# Patient Record
Sex: FEMALE | Race: WHITE | Employment: FULL TIME | URBAN - METROPOLITAN AREA
[De-identification: names, ages, dates, MRNs, and addresses within clinical notes are randomized per-mention and may not be internally consistent; named-entity substitution may affect disease eponyms.]

---

## 2018-06-28 NOTE — PROGRESS NOTES
friction rub. No murmur heard. Pulmonary/Chest: Effort normal and breath sounds normal. No stridor. No respiratory distress. She has no wheezes. She has no rales. She exhibits no mass, no tenderness, no bony tenderness, no laceration, no edema, no deformity, no swelling and no retraction. Right breast exhibits no inverted nipple, no mass, no nipple discharge, no skin change and no tenderness. Left breast exhibits no inverted nipple, no mass, no nipple discharge, no skin change and no tenderness. Breasts are symmetrical.       Breasts are supple bilaterally. No skin dimpling or puckering. No nipple discharge. No lumps nodules or masses appreciated. No axillary lymphadenopathy. Abdominal: Soft. She exhibits no distension. There is no tenderness. There is no rebound and no guarding. Musculoskeletal: Normal range of motion. She exhibits no edema, tenderness or deformity. Right shoulder: Normal.        Left shoulder: Normal.   Lymphadenopathy:     She has no cervical adenopathy. Right cervical: No superficial cervical, no deep cervical and no posterior cervical adenopathy present. Left cervical: No superficial cervical, no deep cervical and no posterior cervical adenopathy present. Right axillary: No pectoral and no lateral adenopathy present. Left axillary: No pectoral and no lateral adenopathy present. Neurological: She is alert and oriented to person, place, and time. Coordination normal.   Skin: Skin is warm and dry. No rash noted. She is not diaphoretic. No erythema. No pallor. Psychiatric: She has a normal mood and affect. Her behavior is normal. Judgment and thought content normal.        Assessment:      46 y.o. female who's risk for breast cancer include age, gender, and nulliparity. She has a family history of colon cancer in her mother in her 63's; Father currently age 80 and undergoing work-up for colon cancer.       Bilateral screening mammogram on

## 2018-07-17 ASSESSMENT — ENCOUNTER SYMPTOMS
SINUS PAIN: 0
SHORTNESS OF BREATH: 0
CHOKING: 0
SINUS PRESSURE: 0
CONSTIPATION: 0
CHEST TIGHTNESS: 0
TROUBLE SWALLOWING: 0
COUGH: 0
BLOOD IN STOOL: 0
DIARRHEA: 0
WHEEZING: 0
NAUSEA: 0
SORE THROAT: 0
EYE DISCHARGE: 0
BACK PAIN: 0
RHINORRHEA: 0
ABDOMINAL PAIN: 0
VOMITING: 0
EYE ITCHING: 0
VOICE CHANGE: 0
ABDOMINAL DISTENTION: 0

## 2018-07-31 ENCOUNTER — HOSPITAL ENCOUNTER (OUTPATIENT)
Dept: GENERAL RADIOLOGY | Age: 53
Discharge: HOME | End: 2018-08-02
Payer: COMMERCIAL

## 2018-07-31 ENCOUNTER — OFFICE VISIT (OUTPATIENT)
Dept: BREAST CENTER | Age: 53
End: 2018-07-31
Payer: COMMERCIAL

## 2018-07-31 VITALS
OXYGEN SATURATION: 98 % | WEIGHT: 184.4 LBS | HEIGHT: 68 IN | SYSTOLIC BLOOD PRESSURE: 118 MMHG | DIASTOLIC BLOOD PRESSURE: 60 MMHG | TEMPERATURE: 98.2 F | HEART RATE: 68 BPM | BODY MASS INDEX: 27.95 KG/M2 | RESPIRATION RATE: 18 BRPM

## 2018-07-31 DIAGNOSIS — N63.20 LEFT BREAST MASS: ICD-10-CM

## 2018-07-31 DIAGNOSIS — N63.0 BREAST LUMP: ICD-10-CM

## 2018-07-31 DIAGNOSIS — N63.0 BREAST LUMP IN UPPER OUTER QUADRANT: ICD-10-CM

## 2018-07-31 DIAGNOSIS — N63.0 BREAST LUMP: Primary | ICD-10-CM

## 2018-07-31 PROCEDURE — 99204 OFFICE O/P NEW MOD 45 MIN: CPT | Performed by: NURSE PRACTITIONER

## 2018-07-31 PROCEDURE — 99203 OFFICE O/P NEW LOW 30 MIN: CPT | Performed by: NURSE PRACTITIONER

## 2018-07-31 PROCEDURE — 76642 ULTRASOUND BREAST LIMITED: CPT

## 2018-07-31 PROCEDURE — G0279 TOMOSYNTHESIS, MAMMO: HCPCS

## 2018-07-31 RX ORDER — CHLORAL HYDRATE 500 MG
1000 CAPSULE ORAL DAILY
COMMUNITY
End: 2019-01-08

## 2018-07-31 RX ORDER — M-VIT,TX,IRON,MINS/CALC/FOLIC 27MG-0.4MG
1 TABLET ORAL DAILY
COMMUNITY
End: 2019-01-08

## 2018-07-31 NOTE — PATIENT INSTRUCTIONS
Patient will be seen in our office 1/8/19 @ 10:00am / imaging prior to appointment 1/8/19 @ 9:00am Breast [facility]

## 2018-10-06 ENCOUNTER — APPOINTMENT (OUTPATIENT)
Dept: GENERAL RADIOLOGY | Age: 53
DRG: 581 | End: 2018-10-06
Payer: COMMERCIAL

## 2018-10-06 ENCOUNTER — ANESTHESIA (OUTPATIENT)
Dept: OPERATING ROOM | Age: 53
DRG: 581 | End: 2018-10-06
Payer: COMMERCIAL

## 2018-10-06 ENCOUNTER — HOSPITAL ENCOUNTER (INPATIENT)
Age: 53
LOS: 1 days | Discharge: HOME | DRG: 581 | End: 2018-10-07
Attending: FAMILY MEDICINE | Admitting: ORTHOPAEDIC SURGERY
Payer: COMMERCIAL

## 2018-10-06 ENCOUNTER — ANESTHESIA EVENT (OUTPATIENT)
Dept: OPERATING ROOM | Age: 53
DRG: 581 | End: 2018-10-06
Payer: COMMERCIAL

## 2018-10-06 VITALS
TEMPERATURE: 97.3 F | DIASTOLIC BLOOD PRESSURE: 70 MMHG | RESPIRATION RATE: 10 BRPM | OXYGEN SATURATION: 100 % | SYSTOLIC BLOOD PRESSURE: 105 MMHG

## 2018-10-06 DIAGNOSIS — S61.419A LACERATION OF HAND INVOLVING EXTENSOR TENDON, INITIAL ENCOUNTER: ICD-10-CM

## 2018-10-06 DIAGNOSIS — S61.451A DOG BITE OF RIGHT HAND, INITIAL ENCOUNTER: Primary | ICD-10-CM

## 2018-10-06 DIAGNOSIS — S66.829A LACERATION OF HAND INVOLVING EXTENSOR TENDON, INITIAL ENCOUNTER: ICD-10-CM
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
## 2018-10-06 LAB
ABO/RH: NORMAL
ALBUMIN SERPL-MCNC: 4 G/DL (ref 3.5–5.2)
ALP BLD-CCNC: 83 U/L (ref 35–104)
ALT SERPL-CCNC: 25 U/L (ref 0–32)
ANION GAP SERPL CALCULATED.3IONS-SCNC: 11 MMOL/L (ref 7–16)
ANTIBODY SCREEN: NORMAL
APTT: 28.1 SEC (ref 24.5–35.1)
AST SERPL-CCNC: 18 U/L (ref 0–31)
BILIRUB SERPL-MCNC: 0.8 MG/DL (ref 0–1.2)
BUN BLDV-MCNC: 13 MG/DL (ref 6–20)
CALCIUM SERPL-MCNC: 8.9 MG/DL (ref 8.6–10.2)
CHLORIDE BLD-SCNC: 104 MMOL/L (ref 98–107)
CO2: 25 MMOL/L (ref 22–29)
CREAT SERPL-MCNC: 0.9 MG/DL (ref 0.5–1)
EKG ATRIAL RATE: 64 BPM
EKG P AXIS: 72 DEGREES
EKG P-R INTERVAL: 178 MS
EKG Q-T INTERVAL: 410 MS
EKG QRS DURATION: 102 MS
EKG QTC CALCULATION (BAZETT): 422 MS
EKG R AXIS: 38 DEGREES
EKG T AXIS: 39 DEGREES
EKG VENTRICULAR RATE: 64 BPM
GFR AFRICAN AMERICAN: >60
GFR NON-AFRICAN AMERICAN: >60 ML/MIN/1.73
GLUCOSE BLD-MCNC: 136 MG/DL (ref 74–109)
HCT VFR BLD CALC: 35.1 % (ref 34–48)
HEMOGLOBIN: 11.5 G/DL (ref 11.5–15.5)
INR BLD: 1.1
MCH RBC QN AUTO: 29.1 PG (ref 26–35)
MCHC RBC AUTO-ENTMCNC: 32.8 % (ref 32–34.5)
MCV RBC AUTO: 88.9 FL (ref 80–99.9)
PDW BLD-RTO: 13.2 FL (ref 11.5–15)
PLATELET # BLD: 218 E9/L (ref 130–450)
PMV BLD AUTO: 9.4 FL (ref 7–12)
POTASSIUM SERPL-SCNC: 3.7 MMOL/L (ref 3.5–5)
PROTHROMBIN TIME: 13.2 SEC (ref 9.3–12.4)
RBC # BLD: 3.95 E12/L (ref 3.5–5.5)
SODIUM BLD-SCNC: 140 MMOL/L (ref 132–146)
TOTAL PROTEIN: 7.1 G/DL (ref 6.4–8.3)
WBC # BLD: 8.2 E9/L (ref 4.5–11.5)

## 2018-10-06 PROCEDURE — 85730 THROMBOPLASTIN TIME PARTIAL: CPT

## 2018-10-06 PROCEDURE — 94761 N-INVAS EAR/PLS OXIMETRY MLT: CPT

## 2018-10-06 PROCEDURE — 20103 EXPL PENTRG WOUND EXTREMITY: CPT | Performed by: ORTHOPAEDIC SURGERY

## 2018-10-06 PROCEDURE — 6360000002 HC RX W HCPCS: Performed by: NURSE ANESTHETIST, CERTIFIED REGISTERED

## 2018-10-06 PROCEDURE — 80053 COMPREHEN METABOLIC PANEL: CPT

## 2018-10-06 PROCEDURE — 11043 DBRDMT MUSC&/FSCA 1ST 20/<: CPT | Performed by: ORTHOPAEDIC SURGERY

## 2018-10-06 PROCEDURE — 2500000003 HC RX 250 WO HCPCS: Performed by: NURSE ANESTHETIST, CERTIFIED REGISTERED

## 2018-10-06 PROCEDURE — 86901 BLOOD TYPING SEROLOGIC RH(D): CPT

## 2018-10-06 PROCEDURE — 26410 REPAIR HAND TENDON: CPT | Performed by: ORTHOPAEDIC SURGERY

## 2018-10-06 PROCEDURE — 85027 COMPLETE CBC AUTOMATED: CPT

## 2018-10-06 PROCEDURE — 7100000000 HC PACU RECOVERY - FIRST 15 MIN: Performed by: ORTHOPAEDIC SURGERY

## 2018-10-06 PROCEDURE — 6370000000 HC RX 637 (ALT 250 FOR IP): Performed by: ORTHOPAEDIC SURGERY

## 2018-10-06 PROCEDURE — 96365 THER/PROPH/DIAG IV INF INIT: CPT

## 2018-10-06 PROCEDURE — 99223 1ST HOSP IP/OBS HIGH 75: CPT | Performed by: ORTHOPAEDIC SURGERY

## 2018-10-06 PROCEDURE — 93005 ELECTROCARDIOGRAM TRACING: CPT | Performed by: STUDENT IN AN ORGANIZED HEALTH CARE EDUCATION/TRAINING PROGRAM

## 2018-10-06 PROCEDURE — 0LQ80ZZ REPAIR LEFT HAND TENDON, OPEN APPROACH: ICD-10-PCS | Performed by: ORTHOPAEDIC SURGERY

## 2018-10-06 PROCEDURE — 2500000003 HC RX 250 WO HCPCS: Performed by: ORTHOPAEDIC SURGERY

## 2018-10-06 PROCEDURE — 73130 X-RAY EXAM OF HAND: CPT

## 2018-10-06 PROCEDURE — 2709999900 HC NON-CHARGEABLE SUPPLY: Performed by: ORTHOPAEDIC SURGERY

## 2018-10-06 PROCEDURE — 96376 TX/PRO/DX INJ SAME DRUG ADON: CPT

## 2018-10-06 PROCEDURE — 3600000002 HC SURGERY LEVEL 2 BASE: Performed by: ORTHOPAEDIC SURGERY

## 2018-10-06 PROCEDURE — 36415 COLL VENOUS BLD VENIPUNCTURE: CPT

## 2018-10-06 PROCEDURE — 7100000001 HC PACU RECOVERY - ADDTL 15 MIN: Performed by: ORTHOPAEDIC SURGERY

## 2018-10-06 PROCEDURE — 85610 PROTHROMBIN TIME: CPT

## 2018-10-06 PROCEDURE — 13132 CMPLX RPR F/C/C/M/N/AX/G/H/F: CPT | Performed by: ORTHOPAEDIC SURGERY

## 2018-10-06 PROCEDURE — 6360000002 HC RX W HCPCS: Performed by: NURSE PRACTITIONER

## 2018-10-06 PROCEDURE — 3600000012 HC SURGERY LEVEL 2 ADDTL 15MIN: Performed by: ORTHOPAEDIC SURGERY

## 2018-10-06 PROCEDURE — 3700000000 HC ANESTHESIA ATTENDED CARE: Performed by: ORTHOPAEDIC SURGERY

## 2018-10-06 PROCEDURE — 3700000001 HC ADD 15 MINUTES (ANESTHESIA): Performed by: ORTHOPAEDIC SURGERY

## 2018-10-06 PROCEDURE — 86900 BLOOD TYPING SEROLOGIC ABO: CPT

## 2018-10-06 PROCEDURE — 2580000003 HC RX 258: Performed by: NURSE ANESTHETIST, CERTIFIED REGISTERED

## 2018-10-06 PROCEDURE — 96375 TX/PRO/DX INJ NEW DRUG ADDON: CPT

## 2018-10-06 PROCEDURE — 0KBD0ZZ EXCISION OF LEFT HAND MUSCLE, OPEN APPROACH: ICD-10-PCS | Performed by: ORTHOPAEDIC SURGERY

## 2018-10-06 PROCEDURE — 90471 IMMUNIZATION ADMIN: CPT | Performed by: NURSE PRACTITIONER

## 2018-10-06 PROCEDURE — 71045 X-RAY EXAM CHEST 1 VIEW: CPT

## 2018-10-06 PROCEDURE — 13133 CMPLX RPR F/C/C/M/N/AX/G/H/F: CPT | Performed by: ORTHOPAEDIC SURGERY

## 2018-10-06 PROCEDURE — 99285 EMERGENCY DEPT VISIT HI MDM: CPT

## 2018-10-06 PROCEDURE — 86850 RBC ANTIBODY SCREEN: CPT

## 2018-10-06 PROCEDURE — 90715 TDAP VACCINE 7 YRS/> IM: CPT | Performed by: NURSE PRACTITIONER

## 2018-10-06 RX ORDER — MEPERIDINE HYDROCHLORIDE 50 MG/ML
12.5 INJECTION INTRAMUSCULAR; INTRAVENOUS; SUBCUTANEOUS EVERY 5 MIN PRN
Status: DISCONTINUED | OUTPATIENT
Start: 2018-10-06 | End: 2018-10-07 | Stop reason: HOSPADM

## 2018-10-06 RX ORDER — GLYCOPYRROLATE 1 MG/5 ML
SYRINGE (ML) INTRAVENOUS PRN
Status: DISCONTINUED | OUTPATIENT
Start: 2018-10-06 | End: 2018-10-07 | Stop reason: SDUPTHER

## 2018-10-06 RX ORDER — PROMETHAZINE HYDROCHLORIDE 25 MG/ML
6.25 INJECTION, SOLUTION INTRAMUSCULAR; INTRAVENOUS
Status: ACTIVE | OUTPATIENT
Start: 2018-10-06 | End: 2018-10-06

## 2018-10-06 RX ORDER — LEVOFLOXACIN 500 MG/1
500 TABLET, FILM COATED ORAL DAILY
COMMUNITY
End: 2018-10-22 | Stop reason: ALTCHOICE

## 2018-10-06 RX ORDER — NEOSTIGMINE METHYLSULFATE 1 MG/ML
INJECTION, SOLUTION INTRAVENOUS PRN
Status: DISCONTINUED | OUTPATIENT
Start: 2018-10-06 | End: 2018-10-07 | Stop reason: SDUPTHER

## 2018-10-06 RX ORDER — MORPHINE SULFATE 2 MG/ML
2 INJECTION, SOLUTION INTRAMUSCULAR; INTRAVENOUS EVERY 5 MIN PRN
Status: DISCONTINUED | OUTPATIENT
Start: 2018-10-06 | End: 2018-10-07 | Stop reason: HOSPADM

## 2018-10-06 RX ORDER — ROCURONIUM BROMIDE 10 MG/ML
INJECTION, SOLUTION INTRAVENOUS PRN
Status: DISCONTINUED | OUTPATIENT
Start: 2018-10-06 | End: 2018-10-07 | Stop reason: SDUPTHER

## 2018-10-06 RX ORDER — SODIUM CHLORIDE 9 MG/ML
INJECTION, SOLUTION INTRAVENOUS CONTINUOUS PRN
Status: DISCONTINUED | OUTPATIENT
Start: 2018-10-06 | End: 2018-10-07 | Stop reason: SDUPTHER

## 2018-10-06 RX ORDER — LIDOCAINE HYDROCHLORIDE 20 MG/ML
INJECTION, SOLUTION INFILTRATION; PERINEURAL PRN
Status: DISCONTINUED | OUTPATIENT
Start: 2018-10-06 | End: 2018-10-07 | Stop reason: SDUPTHER

## 2018-10-06 RX ORDER — FENTANYL CITRATE 50 UG/ML
INJECTION, SOLUTION INTRAMUSCULAR; INTRAVENOUS PRN
Status: DISCONTINUED | OUTPATIENT
Start: 2018-10-06 | End: 2018-10-07 | Stop reason: SDUPTHER

## 2018-10-06 RX ORDER — HYDRALAZINE HYDROCHLORIDE 20 MG/ML
5 INJECTION INTRAMUSCULAR; INTRAVENOUS EVERY 10 MIN PRN
Status: DISCONTINUED | OUTPATIENT
Start: 2018-10-06 | End: 2018-10-07 | Stop reason: HOSPADM

## 2018-10-06 RX ORDER — PROPOFOL 10 MG/ML
INJECTION, EMULSION INTRAVENOUS PRN
Status: DISCONTINUED | OUTPATIENT
Start: 2018-10-06 | End: 2018-10-07 | Stop reason: SDUPTHER

## 2018-10-06 RX ORDER — DIAPER,BRIEF,INFANT-TODD,DISP
EACH MISCELLANEOUS PRN
Status: DISCONTINUED | OUTPATIENT
Start: 2018-10-06 | End: 2018-10-07 | Stop reason: HOSPADM

## 2018-10-06 RX ORDER — ONDANSETRON 2 MG/ML
4 INJECTION INTRAMUSCULAR; INTRAVENOUS ONCE
Status: COMPLETED | OUTPATIENT
Start: 2018-10-06 | End: 2018-10-06

## 2018-10-06 RX ORDER — BUPIVACAINE HYDROCHLORIDE 5 MG/ML
INJECTION, SOLUTION EPIDURAL; INTRACAUDAL PRN
Status: DISCONTINUED | OUTPATIENT
Start: 2018-10-06 | End: 2018-10-07 | Stop reason: HOSPADM

## 2018-10-06 RX ORDER — DEXAMETHASONE SODIUM PHOSPHATE 10 MG/ML
INJECTION, SOLUTION INTRAMUSCULAR; INTRAVENOUS PRN
Status: DISCONTINUED | OUTPATIENT
Start: 2018-10-06 | End: 2018-10-07 | Stop reason: SDUPTHER

## 2018-10-06 RX ORDER — ONDANSETRON 2 MG/ML
INJECTION INTRAMUSCULAR; INTRAVENOUS PRN
Status: DISCONTINUED | OUTPATIENT
Start: 2018-10-06 | End: 2018-10-07 | Stop reason: SDUPTHER

## 2018-10-06 RX ORDER — LABETALOL HYDROCHLORIDE 5 MG/ML
5 INJECTION, SOLUTION INTRAVENOUS EVERY 10 MIN PRN
Status: DISCONTINUED | OUTPATIENT
Start: 2018-10-06 | End: 2018-10-07 | Stop reason: HOSPADM

## 2018-10-06 RX ORDER — MIDAZOLAM HYDROCHLORIDE 1 MG/ML
INJECTION INTRAMUSCULAR; INTRAVENOUS PRN
Status: DISCONTINUED | OUTPATIENT
Start: 2018-10-06 | End: 2018-10-07 | Stop reason: SDUPTHER

## 2018-10-06 RX ADMIN — ROCURONIUM BROMIDE 40 MG: 10 INJECTION INTRAVENOUS at 22:42

## 2018-10-06 RX ADMIN — CEFAZOLIN SODIUM 2 G: 2 SOLUTION INTRAVENOUS at 23:00

## 2018-10-06 RX ADMIN — ONDANSETRON HYDROCHLORIDE 4 MG: 2 INJECTION, SOLUTION INTRAMUSCULAR; INTRAVENOUS at 23:41

## 2018-10-06 RX ADMIN — Medication 0.6 MG: at 23:41

## 2018-10-06 RX ADMIN — MIDAZOLAM HYDROCHLORIDE 2 MG: 1 INJECTION, SOLUTION INTRAMUSCULAR; INTRAVENOUS at 22:42

## 2018-10-06 RX ADMIN — PROPOFOL 150 MG: 10 INJECTION, EMULSION INTRAVENOUS at 22:42

## 2018-10-06 RX ADMIN — SODIUM CHLORIDE: 9 INJECTION, SOLUTION INTRAVENOUS at 22:41

## 2018-10-06 RX ADMIN — Medication 3 MG: at 23:41

## 2018-10-06 RX ADMIN — LIDOCAINE HYDROCHLORIDE 60 MG: 20 INJECTION, SOLUTION INFILTRATION; PERINEURAL at 22:42

## 2018-10-06 RX ADMIN — TETANUS TOXOID, REDUCED DIPHTHERIA TOXOID AND ACELLULAR PERTUSSIS VACCINE, ADSORBED 0.5 ML: 5; 2.5; 8; 8; 2.5 SUSPENSION INTRAMUSCULAR at 16:43

## 2018-10-06 RX ADMIN — FENTANYL CITRATE 75 MCG: 50 INJECTION, SOLUTION INTRAMUSCULAR; INTRAVENOUS at 22:42

## 2018-10-06 RX ADMIN — HYDROMORPHONE HYDROCHLORIDE 0.5 MG: 1 INJECTION, SOLUTION INTRAMUSCULAR; INTRAVENOUS; SUBCUTANEOUS at 18:49

## 2018-10-06 RX ADMIN — DEXAMETHASONE SODIUM PHOSPHATE 10 MG: 10 INJECTION INTRAMUSCULAR; INTRAVENOUS at 22:42

## 2018-10-06 RX ADMIN — CEFAZOLIN SODIUM 2 G: 2 SOLUTION INTRAVENOUS at 16:51

## 2018-10-06 RX ADMIN — HYDROMORPHONE HYDROCHLORIDE 0.5 MG: 1 INJECTION, SOLUTION INTRAMUSCULAR; INTRAVENOUS; SUBCUTANEOUS at 16:55

## 2018-10-06 RX ADMIN — ONDANSETRON HYDROCHLORIDE 4 MG: 2 SOLUTION INTRAMUSCULAR; INTRAVENOUS at 16:55

## 2018-10-06 ASSESSMENT — PULMONARY FUNCTION TESTS
PIF_VALUE: 15
PIF_VALUE: 17
PIF_VALUE: 19
PIF_VALUE: 5
PIF_VALUE: 2
PIF_VALUE: 18
PIF_VALUE: 29
PIF_VALUE: 18
PIF_VALUE: 19
PIF_VALUE: 18
PIF_VALUE: 17
PIF_VALUE: 15
PIF_VALUE: 18
PIF_VALUE: 19
PIF_VALUE: 19
PIF_VALUE: 15
PIF_VALUE: 18
PIF_VALUE: 2
PIF_VALUE: 18
PIF_VALUE: 17
PIF_VALUE: 19
PIF_VALUE: 18
PIF_VALUE: 29
PIF_VALUE: 15
PIF_VALUE: 2
PIF_VALUE: 18
PIF_VALUE: 15
PIF_VALUE: 15
PIF_VALUE: 19
PIF_VALUE: 18
PIF_VALUE: 17
PIF_VALUE: 18
PIF_VALUE: 18
PIF_VALUE: 17
PIF_VALUE: 25
PIF_VALUE: 17
PIF_VALUE: 3
PIF_VALUE: 19
PIF_VALUE: 19
PIF_VALUE: 18
PIF_VALUE: 15
PIF_VALUE: 19
PIF_VALUE: 19
PIF_VALUE: 18
PIF_VALUE: 19
PIF_VALUE: 15
PIF_VALUE: 2
PIF_VALUE: 14
PIF_VALUE: 17
PIF_VALUE: 21
PIF_VALUE: 18
PIF_VALUE: 18
PIF_VALUE: 2
PIF_VALUE: 17
PIF_VALUE: 19
PIF_VALUE: 19
PIF_VALUE: 15
PIF_VALUE: 19
PIF_VALUE: 15
PIF_VALUE: 3
PIF_VALUE: 19
PIF_VALUE: 19
PIF_VALUE: 17
PIF_VALUE: 19
PIF_VALUE: 18
PIF_VALUE: 10
PIF_VALUE: 18
PIF_VALUE: 10
PIF_VALUE: 17
PIF_VALUE: 18
PIF_VALUE: 18
PIF_VALUE: 0
PIF_VALUE: 2

## 2018-10-06 ASSESSMENT — PAIN DESCRIPTION - PAIN TYPE: TYPE: ACUTE PAIN

## 2018-10-06 ASSESSMENT — PAIN DESCRIPTION - LOCATION: LOCATION: HAND

## 2018-10-06 ASSESSMENT — PAIN SCALES - GENERAL
PAINLEVEL_OUTOF10: 1
PAINLEVEL_OUTOF10: 9
PAINLEVEL_OUTOF10: 1
PAINLEVEL_OUTOF10: 3

## 2018-10-06 ASSESSMENT — PAIN DESCRIPTION - ORIENTATION: ORIENTATION: LEFT

## 2018-10-06 NOTE — ED NOTES
Patient presents as a transfer from [place], patient seen by ortho, wound cleaned and dressing changed, plans for surgery this evening.       Leonel Bolton RN  10/06/18 [id number]

## 2018-10-06 NOTE — ED PROVIDER NOTES
Normal.    Constitutional:  Alert, development consistent with age. Neck:  Normal ROM. Supple. Extremity(s):  Left:              Tenderness:  mild. Swelling: None. Deformity: No.               ROM: full range of motion. Skin:  6 cm laceration to the dorsum of hand; 4 cm to the palm thenar region and 3 cm to the volar aspect of the proximal fifth finger. Neurovascular: Motor deficit: none; flexion extension against opposition noted in all MCP P I P and DIP joints. Tendon exposed and hanging. Sensory deficit: none; full sensation intact to light touch in fingers. Pulse deficit: none; 2 + radial pulses intact. Capillary refill: normal; less than 3 seconds in distal finger tips. Lymphatics: No lymphangitis or adenopathy noted. Neurological:  Oriented. Motor functions intact. Physical Exam   Musculoskeletal:        Hands:    Lab / Imaging Results   (All laboratory and radiology results have been personally reviewed by myself)  Labs:  No results found for this visit on 10/06/18. Imaging: All Radiology results interpreted by Radiologist unless otherwise noted. No orders to display       ED Course / Medical Decision Making     Medications   Tetanus-Diphth-Acell Pertussis (BOOSTRIX) injection 0.5 mL (not administered)     ED Course as of Oct 06 1806   Sat Oct 06, 2018   1756 Orthopedics resident notified in person. He will evaluate the patient in the emergency department. [JG]      ED Course User Index  [JG] ARELY Nichols NP     Consult(s):   IP CONSULT TO ORTHOPEDIC SURGERY [facility] Discussed patient with Dr. Claudia Downey and he wants the patient to go to [facility]. 5337 Discussed patient with Dr. Daniela Barton and he accept patient to the ED. Procedure(s):   none    MDM:   Dog bite with tendon rupture and will transfer to [facility] for orthopedic evaluation.  She was given tetanus and ancef 2 GM IV and will transfer

## 2018-10-07 VITALS
BODY MASS INDEX: 27.4 KG/M2 | WEIGHT: 185 LBS | SYSTOLIC BLOOD PRESSURE: 110 MMHG | RESPIRATION RATE: 17 BRPM | HEIGHT: 69 IN | DIASTOLIC BLOOD PRESSURE: 66 MMHG | TEMPERATURE: 98.6 F | OXYGEN SATURATION: 99 % | HEART RATE: 70 BPM
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
## 2018-10-07 PROBLEM — S61.452A: Status: ACTIVE | Noted: 2018-10-07

## 2018-10-07 PROBLEM — S61.259A: Status: ACTIVE | Noted: 2018-10-07

## 2018-10-07 PROBLEM — S61.451A DOG BITE OF RIGHT HAND: Status: ACTIVE | Noted: 2018-10-07

## 2018-10-07 PROCEDURE — 6360000002 HC RX W HCPCS: Performed by: STUDENT IN AN ORGANIZED HEALTH CARE EDUCATION/TRAINING PROGRAM

## 2018-10-07 PROCEDURE — 2580000003 HC RX 258: Performed by: STUDENT IN AN ORGANIZED HEALTH CARE EDUCATION/TRAINING PROGRAM

## 2018-10-07 PROCEDURE — 6370000000 HC RX 637 (ALT 250 FOR IP): Performed by: STUDENT IN AN ORGANIZED HEALTH CARE EDUCATION/TRAINING PROGRAM

## 2018-10-07 PROCEDURE — 1200000000 HC SEMI PRIVATE

## 2018-10-07 RX ORDER — HYDROCODONE BITARTRATE AND ACETAMINOPHEN 5; 325 MG/1; MG/1
1 TABLET ORAL EVERY 6 HOURS PRN
Qty: 28 TABLET | Refills: 0 | Status: SHIPPED | OUTPATIENT
Start: 2018-10-07 | End: 2018-10-14

## 2018-10-07 RX ORDER — HYDROCODONE BITARTRATE AND ACETAMINOPHEN 5; 325 MG/1; MG/1
1 TABLET ORAL EVERY 4 HOURS PRN
Status: DISCONTINUED | OUTPATIENT
Start: 2018-10-07 | End: 2018-10-07 | Stop reason: HOSPADM

## 2018-10-07 RX ORDER — CHLORAL HYDRATE 500 MG
1000 CAPSULE ORAL DAILY
Status: DISCONTINUED | OUTPATIENT
Start: 2018-10-07 | End: 2018-10-07 | Stop reason: CLARIF

## 2018-10-07 RX ORDER — DIPHENHYDRAMINE HYDROCHLORIDE 50 MG/ML
12.5 INJECTION INTRAMUSCULAR; INTRAVENOUS EVERY 6 HOURS PRN
Status: DISCONTINUED | OUTPATIENT
Start: 2018-10-07 | End: 2018-10-07 | Stop reason: HOSPADM

## 2018-10-07 RX ORDER — AMOXICILLIN AND CLAVULANATE POTASSIUM 875; 125 MG/1; MG/1
1 TABLET, FILM COATED ORAL 2 TIMES DAILY
Qty: 20 TABLET | Refills: 0 | Status: SHIPPED | OUTPATIENT
Start: 2018-10-07 | End: 2018-10-07

## 2018-10-07 RX ORDER — ONDANSETRON 2 MG/ML
4 INJECTION INTRAMUSCULAR; INTRAVENOUS EVERY 6 HOURS PRN
Status: DISCONTINUED | OUTPATIENT
Start: 2018-10-07 | End: 2018-10-07 | Stop reason: HOSPADM

## 2018-10-07 RX ORDER — SODIUM CHLORIDE 0.9 % (FLUSH) 0.9 %
10 SYRINGE (ML) INJECTION EVERY 12 HOURS SCHEDULED
Status: DISCONTINUED | OUTPATIENT
Start: 2018-10-07 | End: 2018-10-07 | Stop reason: HOSPADM

## 2018-10-07 RX ORDER — M-VIT,TX,IRON,MINS/CALC/FOLIC 27MG-0.4MG
1 TABLET ORAL DAILY
Status: DISCONTINUED | OUTPATIENT
Start: 2018-10-07 | End: 2018-10-07 | Stop reason: HOSPADM

## 2018-10-07 RX ORDER — ACETAMINOPHEN 325 MG/1
650 TABLET ORAL EVERY 4 HOURS PRN
Status: DISCONTINUED | OUTPATIENT
Start: 2018-10-07 | End: 2018-10-07 | Stop reason: HOSPADM

## 2018-10-07 RX ORDER — HYDROCODONE BITARTRATE AND ACETAMINOPHEN 5; 325 MG/1; MG/1
2 TABLET ORAL EVERY 4 HOURS PRN
Status: DISCONTINUED | OUTPATIENT
Start: 2018-10-07 | End: 2018-10-07 | Stop reason: HOSPADM

## 2018-10-07 RX ORDER — AMOXICILLIN AND CLAVULANATE POTASSIUM 875; 125 MG/1; MG/1
1 TABLET, FILM COATED ORAL 2 TIMES DAILY
Qty: 20 TABLET | Refills: 0 | Status: SHIPPED | OUTPATIENT
Start: 2018-10-07 | End: 2018-10-17

## 2018-10-07 RX ORDER — UBIDECARENONE 75 MG
100 CAPSULE ORAL DAILY
Status: DISCONTINUED | OUTPATIENT
Start: 2018-10-07 | End: 2018-10-07 | Stop reason: HOSPADM

## 2018-10-07 RX ORDER — SODIUM CHLORIDE 0.9 % (FLUSH) 0.9 %
10 SYRINGE (ML) INJECTION PRN
Status: DISCONTINUED | OUTPATIENT
Start: 2018-10-07 | End: 2018-10-07 | Stop reason: HOSPADM

## 2018-10-07 RX ADMIN — Medication 2 G: at 06:48

## 2018-10-07 RX ADMIN — HYDROCODONE BITARTRATE AND ACETAMINOPHEN 2 TABLET: 5; 325 TABLET ORAL at 06:48

## 2018-10-07 RX ADMIN — Medication 10 ML: at 09:47

## 2018-10-07 RX ADMIN — HYDROCODONE BITARTRATE AND ACETAMINOPHEN 2 TABLET: 5; 325 TABLET ORAL at 01:56

## 2018-10-07 RX ADMIN — HYDROCODONE BITARTRATE AND ACETAMINOPHEN 2 TABLET: 5; 325 TABLET ORAL at 12:13

## 2018-10-07 RX ADMIN — HYDROCODONE BITARTRATE AND ACETAMINOPHEN 2 TABLET: 5; 325 TABLET ORAL at 16:05

## 2018-10-07 RX ADMIN — Medication 2 G: at 15:01

## 2018-10-07 ASSESSMENT — PAIN SCALES - GENERAL
PAINLEVEL_OUTOF10: 7
PAINLEVEL_OUTOF10: 0
PAINLEVEL_OUTOF10: 4
PAINLEVEL_OUTOF10: 0
PAINLEVEL_OUTOF10: 7

## 2018-10-07 ASSESSMENT — PAIN DESCRIPTION - ORIENTATION
ORIENTATION: LEFT
ORIENTATION: LEFT

## 2018-10-07 ASSESSMENT — PAIN DESCRIPTION - ONSET: ONSET: ON-GOING

## 2018-10-07 ASSESSMENT — PAIN DESCRIPTION - FREQUENCY: FREQUENCY: CONTINUOUS

## 2018-10-07 ASSESSMENT — PAIN DESCRIPTION - PAIN TYPE
TYPE: ACUTE PAIN
TYPE: ACUTE PAIN;SURGICAL PAIN

## 2018-10-07 ASSESSMENT — PAIN DESCRIPTION - LOCATION
LOCATION: HAND
LOCATION: HAND

## 2018-10-07 ASSESSMENT — PAIN DESCRIPTION - DESCRIPTORS
DESCRIPTORS: ACHING;DISCOMFORT;SORE
DESCRIPTORS: ACHING;CONSTANT;DISCOMFORT

## 2018-10-07 NOTE — PROGRESS NOTES
Department of Orthopedic Surgery  Resident Progress Note    Patient seen and examined. Pain controlled. No new complaints. Denies chest pain, shortness of breath, dizziness/lightheadedness. VITALS:  BP (!) 149/79   Pulse 76   Temp 97.4 °F (36.3 °C) (Temporal)   Resp 18   Ht 5' 8.5\" (1.74 m)   Wt 185 lb (83.9 kg)   SpO2 99%   BMI 27.72 kg/m²     General: alert and oriented to person, place and time, well-developed and well-nourished, in no acute distress    MUSCULOSKELETAL:   left upper extremity:  · Splint C/D/I  · Patient able to wiggle fingers  ·  Cap refill <2 sec  · Sensation intact to touch to finger tips     CBC:   Lab Results   Component Value Date    WBC 8.2 10/06/2018    HGB 11.5 10/06/2018    HCT 35.1 10/06/2018     10/06/2018     PT/INR:    Lab Results   Component Value Date    PROTIME 13.2 10/06/2018    INR 1.1 10/06/2018       ASSESSMENT  · S/P Left hand I&D with EIP repair    PLAN      · Continue physical therapy and protocol: [facility] - AMPARO  · 2 doses ancef 2g post op   · Patient will go home on Augmentin PO  · Pain Control: PO  · Ok to discharge today once post op ABX are complete  · Follow up with Dr. Jaylan Perez in 1 week. Call office to make appointment  · D/C Plan:  Home today     Orthopaedic Attending    I have seen and evaluated the patient with the resident and agree with the above assessments on today's visit. I have performed the key components of the history and physical examination and concur completely with the findings and plans as documented above. Patient doing well. Anticipate discharge home later today with 710 day follow up in orthopedic office. We'll discharge her home on oral Augmentin. She is to maintain her splint, elevation, nonweightbearing left hand.     Electronically signed by   Jn Minaya DO  10/7/2018

## 2018-10-07 NOTE — H&P
Problem Relation Age of Onset    Cancer Mother         colon, ovarian    Cancer Father         colon, prostate     Social History   Substance Use Topics    Smoking status: Never Smoker    Smokeless tobacco: Never Used    Alcohol use No                           Review of Systems   Constitutional: Negative for fever and chills. HENT: Negative for ear pain, sore throat and sinus pressure. Eyes: Negative for pain, discharge and redness. Respiratory: Negative for cough, shortness of breath and wheezing. Cardiovascular: Negative for chest pain. Gastrointestinal: Negative for nausea, vomiting, diarrhea and abdominal distention. Genitourinary: Negative for dysuria and frequency. Musculoskeletal: Negative for back pain and arthralgias. CC: Left hand injury    S: Cici Valladares is a 46 y. o. who Initially presented to [facility] urgent care secondary to injury of the left hand. She was subsequently transferred here for definitive management. . DOI: today, ZA: carmen. Patient states that her neighbor's dog bit her hands despite her putting her hands out to try and keep her distance from the daughter. Patient had significant injury to the left hand including attenuation and exposed extensor tendon. She is right-hand dominant. She works as a  at an [address] [place] [address] Avenue here in [facility]. Denies any pre-existing issues with this hand. Denies any other associated injuries.     Physical Exam  BP (!) 107/58   Pulse 65   Temp 98.4 °F (36.9 °C) (Temporal)   Resp 16   Ht 5' 8.5\" (1.74 m)   Wt 185 lb (83.9 kg)   SpO2 99%   BMI 27.72 kg/m²   O:   CONSTITUTIONAL: awake, alert, cooperative, no apparent distress, and appears stated age  EYES: Lids and lashes normal, pupils equal, round and reactive to light, extra ocular muscles intact, sclera clear, conjunctiva normal  ENT: Normocephalic, without obvious abnormality, atraumatic, external ears without lesions, oral pharynx with moist mucus

## 2018-10-07 NOTE — ANESTHESIA PRE PROCEDURE
Surgical History:        Procedure Laterality Date    BLADDER SURGERY  05/2007    BLADDER SLING    HYSTERECTOMY  05/2007       Social History:    Social History   Substance Use Topics    Smoking status: Never Smoker    Smokeless tobacco: Never Used    Alcohol use No                                Counseling given: Not Answered      Vital Signs (Current):   Vitals:    10/06/18 1654 10/06/18 1701 10/06/18 1751 10/06/18 2136   BP:  132/72 129/77 102/85   Pulse: 80  77 76   Resp: 18 17 16   Temp: 98.1 °F (36.7 °C)  97.8 °F (36.6 °C) 98.7 °F (37.1 °C)   TempSrc: Oral   Oral   SpO2: 98%  98% 99%   Weight:       Height:                                                  BP Readings from Last 3 Encounters:   10/06/18 102/85   10/06/18 109/69   07/31/18 118/60       NPO Status: Time of last liquid consumption: 1100                        Time of last solid consumption: 1130                        Date of last liquid consumption: 10/06/18                        Date of last solid food consumption: 10/06/18    BMI:   Wt Readings from Last 3 Encounters:   10/06/18 185 lb (83.9 kg)   07/31/18 184 lb 6.4 oz (83.6 kg)   05/04/18 184 lb 3.2 oz (83.6 kg)     Body mass index is 27.72 kg/m². CBC:   Lab Results   Component Value Date    WBC 8.2 10/06/2018    RBC 3.95 10/06/2018    HGB 11.5 10/06/2018    HCT 35.1 10/06/2018    MCV 88.9 10/06/2018    RDW 13.2 10/06/2018     10/06/2018       CMP:   Lab Results   Component Value Date     10/06/2018    K 3.7 10/06/2018     10/06/2018    CO2 25 10/06/2018    BUN 13 10/06/2018    CREATININE 0.9 10/06/2018    GFRAA >60 10/06/2018    LABGLOM >60 10/06/2018    GLUCOSE 136 10/06/2018    PROT 7.1 10/06/2018    CALCIUM 8.9 10/06/2018    BILITOT 0.8 10/06/2018    ALKPHOS 83 10/06/2018    AST 18 10/06/2018    ALT 25 10/06/2018       POC Tests: No results for input(s): POCGLU, POCNA, POCK, POCCL, POCBUN, POCHEMO, POCHCT in the last 72 hours.     Coags:   Lab Results Component Value Date    PROTIME 13.2 10/06/2018    INR 1.1 10/06/2018    APTT 28.1 10/06/2018       HCG (If Applicable): No results found for: PREGTESTUR, PREGSERUM, HCG, HCGQUANT     ABGs: No results found for: PHART, PO2ART, NWL1FZR, HEO5RVY, BEART, I7KCSEEN     Type & Screen (If Applicable):  No results found for: LABABO, [address]    Anesthesia Evaluation  Patient summary reviewed and Nursing notes reviewed no history of anesthetic complications:   Airway: Mallampati: II  TM distance: >3 FB   Neck ROM: full  Mouth opening: > = 3 FB Dental: normal exam         Pulmonary:Negative Pulmonary ROS   (+) decreased breath sounds,                             Cardiovascular:Negative CV ROS            Rhythm: regular  Rate: normal                    Neuro/Psych:   Negative Neuro/Psych ROS              GI/Hepatic/Renal: Neg GI/Hepatic/Renal ROS            Endo/Other: Negative Endo/Other ROS                    Abdominal:           Vascular: negative vascular ROS. Anesthesia Plan      general     ASA 1       Induction: intravenous. MIPS: Postoperative opioids intended and Prophylactic antiemetics administered. Anesthetic plan and risks discussed with patient. Plan discussed with CRNA.                   Roly Zhnag MD   10/6/2018

## 2018-10-09 NOTE — OP NOTE
surface of the proximal phalanx to  the small finger. Clinically, the patient did appear to have full  function of her hand and was able to actively flexion and extend all  digit with exception of weakness to the index finger. The extensor  tendon of the index finger had been avulsed from the myotendinous  junction and this was completely exposed out to the wound on the  dorsum of the hand. Treatment options were discussed and outlined  with the patient with recommendations for urgent operative  intervention for irrigation and debridement, exploration of the wound,  soft tissue repair as indicated. The patient verbalized understanding  of this. It was discussed due to the nature of the injury of the one  associated extensor tendon, this was the unamenable to direct repair  due to its location from it avulsion from the muscle belly and would  likely require argumentation to adjacent tendons. The patient  understood this. Again risks and benefits were outlined in detail  with the patient. She verbalized understanding of all that was  discussed. All her questions were addressed to her satisfaction. She  did elect to proceed with the procedure as outlined. DESCRIPTION OF THE PROCEDURE:  The patient was brought to the  operating suite where she was placed on the operating table in supine  position. She received a general anesthetic by the Department of  Anesthesia as well as 2 gm of Ancef intravenously. Left upper  extremity was now sterilely prepped and draped out in usual fashion  using Betadine scrub. Surgical timeout was then performed per  protocol by all members of the surgical team.  Arm was elevated and  exsanguinated with an Esmarch. Tourniquet was set at 250 mmHg  pressure. Procedure first began with exploration of the wounds on the  palmar surface of the hand.   Starting over the hypothenar region over  the 3 cm wound which went to the subcutaneous tissues, down into the  hypothenar muscle belly.  This wound was now extended for appropriate  exploration with the scalpel into the skin and subcutaneous tissues. Further blunt dissection was taken down into the myofascial layers. There was some attenuation through the muscle belly and the attenuated  tissue was sharply excised with dissection scissors back to healthy  muscular tissue. There was no associated neurovascular injury within  this wound. After appropriate exploration, this was thoroughly  irrigated with normal saline solution. Attention was now turned to  the wound over the small finger flexor surface. This was a 2.5 cm  oblique wound. This was explored as it was extended with the scalpel  proximally and distally through the skin and subcutaneous tissues with  the blunt dissection was taken down through the myofascial layers in  order to visualize the flexor tendons. The tendon sheath was intact  as well as the tendons. Neurovascular bundle was identified, and this  was dissected out. There was a small branch of the digital artery  which had been lacerated, this was cauterized with electrocautery. The remainder of the digit appeared to be without any obvious injury. This was now thoroughly irrigated. These two wounds were now closed  with nylon suture closing both the hypothenar and the one over the  small finger. Attention was now turned to the more complex wound over  the dorsum of the hand, this was a longitudinal oblique wound over the  second ray. The wound was extended proximally and distally in  longitudinal fashion with a scalpel. Full thickness flaps are  created, these were now reflected with retractors. Further  exploration demonstrates a traumatic avulsion rupture of the extensor  indicis proprius at the proximal myotendinous junction with entire  length of the tendon exposed through the wound. The deep underlying  extensor digitorum communis tendon to the index and adjacent digits  were intact.   Further dissection deep and exploration demonstrated  laceration to the lumbrical between the second and third metacarpals. There was no obvious penetration of the bone; however there is  attenuation of the lumbrical muscle belly. The attenuated tissue was  sharply excised back to healthy tissue. The extensor indicis proprius  tendon was now debrided and excised back to healthy distal tendon. All layers were now thoroughly irrigated and debrided. Skin,  subcutaneous tissue, and myofascial tissue using curettes and thorough  irrigation. We felt we had appropriate clean wound base at this  point. The extensor indicis proprius tendon was now repaired back to  the extensor digitorum communis tendon of the index finger using 5-0  Prolene in a running fashion to imbricate the two tendons together. We ensured there was no tendinous lag or over-excursion to this  tendon. At this point in time, we felt we had appropriate repair of  the tendon, appropriate debridement of the complex wound in the dorsum  of the hand 5 cm in length, it was now closed as well as the extension  in layered fashion. All wounds able to be closed without undue  tension. The wounds were now dressed with antibiotic ointment,  Xeroform, 4 x 4, fluffs, Webril and a flexion block short arm splint. The patient was now extubated uneventfully, transferred from [facility] into postanesthesia care unit in stable condition. POSTOPERATIVE PLAN:  The patient will be admitted overnight for  observation and 24 hours of perioperative antibiotics. She will be  then discharged on 1 week course of oral antibiotics due to the nature  of the injury. She is maintain her splint elevation, nonweightbearing  affected extremity. We did allow for some active mobilization in the  DIP joints of all digits. Once she is discharged from the hospital,  she will follow up in the orthopedic office in two weeks for repeat  evaluation.         DO RAUL Henderson:

## 2018-10-22 ENCOUNTER — OFFICE VISIT (OUTPATIENT)
Dept: ORTHOPEDIC SURGERY | Age: 53
End: 2018-10-22
Payer: COMMERCIAL

## 2018-10-22 VITALS
HEART RATE: 59 BPM | WEIGHT: 184 LBS | DIASTOLIC BLOOD PRESSURE: 77 MMHG | TEMPERATURE: 98.2 F | HEIGHT: 68 IN | SYSTOLIC BLOOD PRESSURE: 116 MMHG | BODY MASS INDEX: 27.89 KG/M2

## 2018-10-22 DIAGNOSIS — S61.452D DOG BITE OF LEFT HAND, SUBSEQUENT ENCOUNTER: Primary | ICD-10-CM
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
## 2018-10-22 PROCEDURE — 99024 POSTOP FOLLOW-UP VISIT: CPT | Performed by: PHYSICIAN ASSISTANT

## 2018-10-22 PROCEDURE — 99203 OFFICE O/P NEW LOW 30 MIN: CPT

## 2018-10-22 RX ORDER — HYDROCODONE BITARTRATE AND ACETAMINOPHEN 5; 325 MG/1; MG/1
1 TABLET ORAL EVERY 6 HOURS PRN
COMMUNITY
End: 2018-10-22 | Stop reason: ALTCHOICE

## 2018-10-22 RX ORDER — HYDROCODONE BITARTRATE AND ACETAMINOPHEN 5; 325 MG/1; MG/1
1 TABLET ORAL EVERY 8 HOURS PRN
Qty: 21 TABLET | Refills: 0 | Status: SHIPPED | OUTPATIENT
Start: 2018-10-22 | End: 2018-10-29

## 2018-10-22 NOTE — PROGRESS NOTES
euthymic. Extremity:  Left Upper Extremity  Skin is clean dry and intact  Mild edema noted  Radial pulse palpable, fingers warm with BCR  Flex/extension intact to wrist, thumb and fingers- index finger not tested. Patient has sensation if increased tension with wrist flexion over index finger. Finger opposition intact  Finger adduction/abduction intact  Finger crossover intact  Subjectively states sensation intact to radial/medial/ulnar distribution. Patient subjectively states intact sensation over ulnar aspect of ring finger, intact over radial aspect of little finger, no sensation over ulnar aspect of little finger. Incision well approximated with no redness, drainage or warmth, suture intact to the palmar and dorsal aspect of left hand, these appear ready for removal.       /77 (Site: Right Upper Arm, Position: Sitting, Cuff Size: Medium Adult)   Pulse 59   Temp 98.2 °F (36.8 °C) (Oral)   Ht 5' 8\" (1.727 m)   Wt 184 lb (83.5 kg)   BMI 27.98 kg/m²     XR:   No XR obtained today    Assessment:   Diagnosis Orders   1. Dog bite of left hand, subsequent encounter         Plan:  Remove sutures, steri strips placed over top, instructed on removal.  WB:  Non-weight bearing  Patient is placed back into an extension radial gutter splint, DIP joints allowed for  active mobilization  OT: Start occupational therapy for extensor tendon protocol  Maintain splint, keep clean and dry, continue to elevate left hand to decrease swelling. New Rx for pain medicine sent to pharmacy  Patient to follow up in 3 weeks for reevaluation prior to her leaving town for 2 weeks    Controlled Substances Monitoring:     RX Monitoring 10/22/2018   Attestation The Prescription Monitoring Report for this patient was reviewed today. Documentation No signs of potential drug abuse or diversion identified.      Electronically signed by Raulito Arechiga PA-C on 10/22/2018 at 11:57 AM

## 2018-10-22 NOTE — PATIENT INSTRUCTIONS
Sutures removed today, steri strips placed over top- if these do not fall off within 10 days they can be removed  Referral today to [facility] applied today, keep clean and dry    New RX for pain medicine e-scribed to your pharmacy    Follow up in 3 weeks for reevaluation prior to leaving on your trip    Call sooner with questions or concerns

## 2018-10-24 ENCOUNTER — HOSPITAL ENCOUNTER (OUTPATIENT)
Dept: OCCUPATIONAL THERAPY | Age: 53
Setting detail: THERAPIES SERIES
Discharge: HOME | End: 2018-10-24
Payer: COMMERCIAL

## 2018-10-24 PROCEDURE — 97140 MANUAL THERAPY 1/> REGIONS: CPT | Performed by: OCCUPATIONAL THERAPIST

## 2018-10-24 PROCEDURE — G8985 CARRY GOAL STATUS: HCPCS | Performed by: OCCUPATIONAL THERAPIST

## 2018-10-24 PROCEDURE — 97165 OT EVAL LOW COMPLEX 30 MIN: CPT | Performed by: OCCUPATIONAL THERAPIST

## 2018-10-24 PROCEDURE — G8984 CARRY CURRENT STATUS: HCPCS | Performed by: OCCUPATIONAL THERAPIST

## 2018-10-24 PROCEDURE — 97110 THERAPEUTIC EXERCISES: CPT | Performed by: OCCUPATIONAL THERAPIST

## 2018-10-24 NOTE — PROGRESS NOTES
[facility] THERAPY INITIAL EVALUATION     Phone: [phone] Fax: [phone]    Date:  10/24/2018  Initial Evaluation Date: 10/24/18    Patient Name:  Jonna Nair    :  1965    Restrictions/Precautions:  Per protocol, low fall risk  Diagnosis:  Ext tendon laceration/ Dog bite. Insurance/Certification information:  [employer]  Referring Physician:  Yina Thayer PA-C  Date of Surgery/Injury: Oct 6th// Oct 6th  Plan of care signed (Y/N):  no  Visit# / total visits: -    Past Medical History: No past medical history on file. Past Surgical History:   Past Surgical History:   Procedure Laterality Date    BLADDER SURGERY  2007    BLADDER SLING    HYSTERECTOMY  2007    DE DRAIN SKIN ABSCESS COMPLIC Left     LEFT HAND INCISION AND DRAINAGE POSSIBLE TENDON REPAIR performed by Dorothye Bosworth, DO at [address]       Reason for Referral: pt. Reports having a dog bit and having to have surgery for repair. Stitches were removed this past Monday. Pt. Reports some pain, swelling and decreased functional use of the left hand. Home Living: lives with . Prior Level of Function: independent    Pain Level: 5 on scale of 1-10, aching    Cognition:   Alert/Oriented x3     IADL History  Homemaking Responsibility: limited with right hand mainly, very light use of the left hand. Shopping Responsibility: limited, right hand mainly  Mode of Transportation: pt. drives  Leisure & Hobbies: limited  Work: works for [employer]. ADL  Feeding:  independent  Grooming:  independent  Bathing:  independent  UE Dressing:  independent  LE Dressing:  independent  Toileting:  independent  Transfer:  independent    UE ASSESSMENT:    RUE AROM/PROM:   AROM noted as follows:  WNL                                          Wrist ext/flex                                           6weeks or 18 sessions if needed. Treatment to include: HEP, Therapeutic Functional Activities & Exercises, Strengthening, Stretching, AROM/AAROM/PROM, GM/FM Coordination Retraining, Desensitization, ADL/IADL re-training, Tendon Gliding, Manual Techniques, Pain Management with/without modalities PRN, Fluidotherapy, Ultra Sound, Electrical Stimulation, Massage, Edema management, Joint Protection/Training, Splinting, Ergonomics, Adaptive Equipment Assessment/Training, Energy Conservation/Work Simplification training, and Safety retraining all with patient education as required by individual diagnosis and goals. GOALS (Long term same as Short term):  1) Patient will demonstrate good understanding of home program(exercises/activities/diagnosis/prognosis/goals) with good accuracy. 2) Patient will demonstrate increased active/passive range of motion of their left hand to [facility] for ADL/IADL completion. 3) Patient will demonstrate increased /pinch strength of at least 10 / 5 pinch pounds of their L hand. (when protocol permitting)  4) Patient to report decreased pain in their affected L hand from 0-5/10 to 0-2/10 or less with resistive functional use. 5) Will fabricated needed splint and patient to report 100% compliance with their splint wear, care, and precautions if needed. 6) Patient will be knowledgeable of edema control techniques as evident with decreases from mod to none. 7) Patient will demonstrate a non-tender/non-adherent scar and good tissue mobility. 8) Patient will report ADL functions same as prior to diagnosis of dog bite.     OT G-codes:  Carrying, Handling, Moving objects   (Current status): Dub Nitza (Discharge Goal): CI        Please review Patient's OT evaluation and if you agree sign/date and fax back to us at our [facility] fax # [phone] Signature:____________________________ Date:__________________       Judy Huerta OT/L, [address]

## 2018-10-25 ENCOUNTER — HOSPITAL ENCOUNTER (OUTPATIENT)
Dept: OCCUPATIONAL THERAPY | Age: 53
Setting detail: THERAPIES SERIES
Discharge: HOME | End: 2018-10-25
Payer: COMMERCIAL

## 2018-10-25 PROCEDURE — 97140 MANUAL THERAPY 1/> REGIONS: CPT | Performed by: OCCUPATIONAL THERAPIST

## 2018-10-25 PROCEDURE — 97760 ORTHOTIC MGMT&TRAING 1ST ENC: CPT | Performed by: OCCUPATIONAL THERAPIST

## 2018-10-25 PROCEDURE — 97110 THERAPEUTIC EXERCISES: CPT | Performed by: OCCUPATIONAL THERAPIST

## 2018-10-25 NOTE — PROGRESS NOTES
Occupational Therapy    OCCUPATIONAL THERAPY PROGRESS NOTE    Date:  10/25/2018                          Initial Evaluation Date: 10/24/2018    Patient Name:  Deanna Viveros                  :  1965     Restrictions/Precautions:  Per protocol, low fall risk  Diagnosis:  Ext tendon laceration/ Dog bite. Insurance/Certification information:  [facility]  Referring Physician:  Jaiden Sparrow PA-C  Date of Surgery/Injury: Oct 6th// Oct 6th  Plan of care signed (Y/N):  no  Visit# / total visits: -    Pain Level:  3-4/10 pain in L IF and hand    Subjective: \"I think I have been using this hand too much - maybe that's why it hurts sometimes. \"     Objective:  Updated POC to be completed by 2018. INTERVENTION: COMPLETED: SPECIFICS/COMMENTS:   Modality:               AROM:     L hand hook fist X 10 - 15 reps'    L thumb flex/ ext. X 15 - 20 rep's    AAROM:               PROM/Stretching:               Scar Mass/Edema Control:     Retrograde massage  x L hand. digits. Pt fitted with white compression sleeves to wear day and or night to decrease edema. Strengthening:               Other:     splint x Fabricated a wrist/ hand P-1 blocking splint to wear  - can remove for shower. A hand piece was fabricated to wear at night putting the digits in full extension. Pt was able to don and doff. HEP x reviewed with pt. Doing hook fist 10 - 15 rep's every hour, thumb flexion extension exercise 3-4 times a day. Edema control techniques reviewed. Assessment/Comments: Pt is making Good progress toward stated plan of care. Therapist explained to the pt what her sx involved and so why she is restricted with the splint.  She appeared to understand and was glad to understand why she could not do gripping tasks or use her hand.   -Rehab Potential: Good  -Requires OT Follow Up: Yes  Time In: 1:00 pm            Time Out: 2:15 pm             Visit #:

## 2018-10-29 ENCOUNTER — HOSPITAL ENCOUNTER (OUTPATIENT)
Dept: OCCUPATIONAL THERAPY | Age: 53
Setting detail: THERAPIES SERIES
Discharge: HOME | End: 2018-10-29
Payer: COMMERCIAL

## 2018-10-29 PROCEDURE — 97760 ORTHOTIC MGMT&TRAING 1ST ENC: CPT | Performed by: OCCUPATIONAL THERAPIST

## 2018-10-29 PROCEDURE — 97140 MANUAL THERAPY 1/> REGIONS: CPT | Performed by: OCCUPATIONAL THERAPIST

## 2018-10-29 PROCEDURE — 97035 APP MDLTY 1+ULTRASOUND EA 15: CPT | Performed by: OCCUPATIONAL THERAPIST

## 2018-10-29 PROCEDURE — 97110 THERAPEUTIC EXERCISES: CPT | Performed by: OCCUPATIONAL THERAPIST

## 2018-11-02 ENCOUNTER — HOSPITAL ENCOUNTER (OUTPATIENT)
Dept: OCCUPATIONAL THERAPY | Age: 53
Setting detail: THERAPIES SERIES
Discharge: HOME | End: 2018-11-02
Payer: COMMERCIAL

## 2018-11-02 ENCOUNTER — APPOINTMENT (OUTPATIENT)
Dept: OCCUPATIONAL THERAPY | Age: 53
End: 2018-11-02
Payer: COMMERCIAL

## 2018-11-02 PROCEDURE — 97140 MANUAL THERAPY 1/> REGIONS: CPT | Performed by: OCCUPATIONAL THERAPIST

## 2018-11-02 PROCEDURE — 97110 THERAPEUTIC EXERCISES: CPT | Performed by: OCCUPATIONAL THERAPIST

## 2018-11-02 PROCEDURE — 97035 APP MDLTY 1+ULTRASOUND EA 15: CPT | Performed by: OCCUPATIONAL THERAPIST

## 2018-11-09 ENCOUNTER — HOSPITAL ENCOUNTER (OUTPATIENT)
Dept: OCCUPATIONAL THERAPY | Age: 53
Setting detail: THERAPIES SERIES
Discharge: HOME | End: 2018-11-09
Payer: COMMERCIAL

## 2018-11-09 PROCEDURE — 97035 APP MDLTY 1+ULTRASOUND EA 15: CPT | Performed by: OCCUPATIONAL THERAPIST

## 2018-11-09 PROCEDURE — 97110 THERAPEUTIC EXERCISES: CPT | Performed by: OCCUPATIONAL THERAPIST

## 2018-11-09 PROCEDURE — 97140 MANUAL THERAPY 1/> REGIONS: CPT | Performed by: OCCUPATIONAL THERAPIST

## 2018-11-09 NOTE — PROGRESS NOTES
x Reviewed today for the next 2 weeks as pt will be on vacation. See attached sheet. Added MP flex/ext AROM, wean from splint after her Dr's appointment next week but to do NO resistive tasks yet. To add gentle fisting starting with MP flexion beginning next week ( wk 6 post op). Assessment/Comments: Pt is making Good progress toward stated plan of care. Pt appeared to understand all new instructions provided for the next 2 weeks (pt on vacation). Pt to resume therapy she gets back on 11/30/2018. Pt was told to call is she has any concerns or questions. Re- Assessment:  AROM:    Wrist flexion         15*    Wrist extension    40*    RD                        15*    UD                         25*  Digital flexion    MP Flex     PIP flex      DIP flex    IF                      45*                77*           38*    MF                    46*                72*           35*    RF                    37*                70*           20*    SF                     44*                64*           35*  Opposition is full.    -Rehab Potential: Good  -Requires OT Follow Up: Yes  Time In:  8:00 am            Time Out: 9:10 am             Visit #: 5    Treatment Charges: Mins Units   Modalities/ /christina 7/10 1   Ther Exercise 35 2   Manual Therapy 20 1   Thera Activities     ADL/Home Mgt      Neuro Re-education     Gait Training     Group Therapy     Non-Billable Service Time_MHP     Other/splint      Total Time/Units 70 4       -Response to Treatment: Pt continues to have edema in her L hand - fitted with an isotoner glove to wear at night and/or a few hours during the day. Pt to continue to work on edema and scar  While on vacation as well as her new exercise program. Pt is maintaining PIP extension without wearing her extension plate at night.     Goals: Goals for pt can be see on initial eval occurring on 10/24/2018    Plan:   [x]  Continues Plan of care: Treatment covered based on POC and graduated to

## 2018-11-14 ENCOUNTER — OFFICE VISIT (OUTPATIENT)
Dept: ORTHOPEDIC SURGERY | Age: 53
End: 2018-11-14
Payer: COMMERCIAL

## 2018-11-14 VITALS
HEIGHT: 68 IN | BODY MASS INDEX: 27.89 KG/M2 | WEIGHT: 184 LBS | DIASTOLIC BLOOD PRESSURE: 73 MMHG | SYSTOLIC BLOOD PRESSURE: 106 MMHG | HEART RATE: 69 BPM

## 2018-11-14 DIAGNOSIS — S61.452D DOG BITE OF LEFT HAND, SUBSEQUENT ENCOUNTER: Primary | ICD-10-CM
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
## 2018-11-14 PROCEDURE — 99024 POSTOP FOLLOW-UP VISIT: CPT | Performed by: NURSE PRACTITIONER

## 2018-11-14 PROCEDURE — 99212 OFFICE O/P EST SF 10 MIN: CPT

## 2018-11-14 RX ORDER — HYDROCODONE BITARTRATE AND ACETAMINOPHEN 5; 325 MG/1; MG/1
1 TABLET ORAL EVERY 12 HOURS PRN
Qty: 14 TABLET | Refills: 0 | Status: SHIPPED | OUTPATIENT
Start: 2018-11-14 | End: 2018-12-14 | Stop reason: SDUPTHER

## 2018-11-14 RX ORDER — HYDROCODONE BITARTRATE AND ACETAMINOPHEN 5; 325 MG/1; MG/1
1 TABLET ORAL EVERY 6 HOURS PRN
COMMUNITY
End: 2018-11-14 | Stop reason: SDUPTHER

## 2018-11-14 NOTE — PROGRESS NOTES
OP: SURGEON:  Jalen Eason DO  DATE OF PROCEDURE:  10/06/2018  OPERATIONS PERFORMED:  1. Left hand exploration of penetrating wound, dorsum of left hand including extension of wound. 2.  Left hand exploration of penetrating wound, hypothenar region with extension of wound. 3.  Left hand exploration of penetrating wound, left small finger with extension of wound. 4.  Irrigation and debridement of skin, subcutaneous tissue, muscle, left hand multiple regions. 5.  Repair extensor indicis proprius, left hand. 6.  Closure complex wounds, left hand, 10.5 cm in length. Subjective:  Obie Scott is approximately 6 weeks follow-up from the above surgery. Patient is RHD. Injuries occurred with traumatic lacerations secondary to dog bites. She continues in therapy and wearing her brace at night. Pain to extremity is mild is taking pain medication, Norco mostly at night. She continues to  complains of numbness in her little finger but now has some tingling as well. She is back to using her hand for many activities, but not really lifting, pushing or pulling anything heavy. In therapy she has not yet started strengthening, doing mostly ROM. She is also doing exercises at home.      Review of Systems -    General ROS: negative for - chills, fatigue, fever or night sweats  Respiratory ROS: no cough, shortness of breath, or wheezing  Cardiovascular ROS: no chest pain or dyspnea on exertion  Gastrointestinal ROS: no abdominal pain, nausea, vomiting, diarrhea, constipation,or black or bloody stools  Genitourinary: no hematuria, dysuria, or incontinence   Musculoskeletal ROS: negative for -back or neck pain or stiffness, also see HPI  Neurological ROS: no TIA or stroke symptoms       Objective:    General: Alert and oriented X 3, normocephalic atraumatic, external ears and eye normal, sclera clear, no acute distress, respirations easy and unlabored with no audible wheezes, skin warm and dry, speech and dress appropriate

## 2018-11-30 ENCOUNTER — HOSPITAL ENCOUNTER (OUTPATIENT)
Dept: OCCUPATIONAL THERAPY | Age: 53
Setting detail: THERAPIES SERIES
Discharge: HOME | End: 2018-11-30
Payer: COMMERCIAL

## 2018-11-30 PROCEDURE — G8984 CARRY CURRENT STATUS: HCPCS | Performed by: OCCUPATIONAL THERAPIST

## 2018-11-30 PROCEDURE — 97140 MANUAL THERAPY 1/> REGIONS: CPT | Performed by: OCCUPATIONAL THERAPIST

## 2018-11-30 PROCEDURE — 97035 APP MDLTY 1+ULTRASOUND EA 15: CPT | Performed by: OCCUPATIONAL THERAPIST

## 2018-11-30 PROCEDURE — G8985 CARRY GOAL STATUS: HCPCS | Performed by: OCCUPATIONAL THERAPIST

## 2018-11-30 PROCEDURE — 97530 THERAPEUTIC ACTIVITIES: CPT | Performed by: OCCUPATIONAL THERAPIST

## 2018-12-03 ENCOUNTER — HOSPITAL ENCOUNTER (OUTPATIENT)
Dept: OCCUPATIONAL THERAPY | Age: 53
Setting detail: THERAPIES SERIES
Discharge: HOME | End: 2018-12-03
Payer: COMMERCIAL

## 2018-12-03 PROCEDURE — 97022 WHIRLPOOL THERAPY: CPT | Performed by: OCCUPATIONAL THERAPIST

## 2018-12-03 PROCEDURE — 97530 THERAPEUTIC ACTIVITIES: CPT | Performed by: OCCUPATIONAL THERAPIST

## 2018-12-03 PROCEDURE — 97140 MANUAL THERAPY 1/> REGIONS: CPT | Performed by: OCCUPATIONAL THERAPIST

## 2018-12-04 ENCOUNTER — HOSPITAL ENCOUNTER (OUTPATIENT)
Dept: OCCUPATIONAL THERAPY | Age: 53
Setting detail: THERAPIES SERIES
Discharge: HOME | End: 2018-12-04
Payer: COMMERCIAL

## 2018-12-04 PROCEDURE — 97530 THERAPEUTIC ACTIVITIES: CPT | Performed by: OCCUPATIONAL THERAPIST

## 2018-12-04 PROCEDURE — 97140 MANUAL THERAPY 1/> REGIONS: CPT | Performed by: OCCUPATIONAL THERAPIST

## 2018-12-04 PROCEDURE — 97018 PARAFFIN BATH THERAPY: CPT | Performed by: OCCUPATIONAL THERAPIST

## 2018-12-04 NOTE — PROGRESS NOTES
as needed and glove   Gel pad over dorsal scar x To wear with compression sleeve 3 - 6 hours. isotoner glove x fitted with to wear as needed   Gel sleeve x Fitted with to wear on SF to soften her proximal scar begin wearing 30 min to 1 hr.   Scar massage x All scar areas with lotion and without. Strengthening:     Yellow sponge  2 sets of 10 Grasp and release for pumping fluid   Yellow putty x Gripping 5 min. - gentle and manipulating. Other:     splint x Not wearing at all now. HEP x Added finger MP flex A and PROM, yellow sponge squeeze, finger add and abduc, and use hand for light functional tasks at home, easy putty today, MP flexion stretch table top and place and hold fist.     Assessment/Comments: Pt is making Good progress toward stated plan of care. Pt appears to understand new instructions for her HEP added today.       -Rehab Potential: Good  -Requires OT Follow Up: Yes  Time In:  9:00 am            Time Out: 10:10 am             Visit #: 2  ( 8)     Treatment Charges: Mins Units   Modalities/ US/christina 12 1   Ther Exercise     Manual Therapy 30 2   Thera Activities 15 1   ADL/Home Mgt      Neuro Re-education     Gait Training     Group Therapy     Non-Billable Service Time- ice 10    Other/splint      Total Time/Units 70 4       -Response to Treatment: Pt is anxious she does not have a full fist yet and her hand continues to be swollen. Therapist told her swelling would  go down gradually - using her hand now for light activities would help. Pt states she is using her L hand more at home. Goals: Goals for pt can be see on initial eval occurring on 10/24/2018 and on 11/30/2018. GOALS (Long term same as Short term):  1) Patient will demonstrate good understanding of home program(exercises/activities/diagnosis/prognosis/goals) with good accuracy. Goal in progress. Adjusted every session.    2) Patient will demonstrate increased active/passive range of motion of their left hand to

## 2018-12-07 ENCOUNTER — HOSPITAL ENCOUNTER (OUTPATIENT)
Dept: OCCUPATIONAL THERAPY | Age: 53
Setting detail: THERAPIES SERIES
Discharge: HOME | End: 2018-12-07
Payer: COMMERCIAL

## 2018-12-07 PROCEDURE — 97530 THERAPEUTIC ACTIVITIES: CPT | Performed by: OCCUPATIONAL THERAPIST

## 2018-12-07 PROCEDURE — 97035 APP MDLTY 1+ULTRASOUND EA 15: CPT | Performed by: OCCUPATIONAL THERAPIST

## 2018-12-07 PROCEDURE — 97140 MANUAL THERAPY 1/> REGIONS: CPT | Performed by: OCCUPATIONAL THERAPIST

## 2018-12-07 NOTE — PROGRESS NOTES
Occupational Therapy    OCCUPATIONAL THERAPY PROGRESS NOTE    RE-  ASSESSMENT    Date:  2018                          Initial Evaluation Date: 10/24/2018    Patient Name:  Juan Browne                  :  1965     Restrictions/Precautions:  Per protocol, low fall risk  Diagnosis:  Ext tendon laceration/ Dog bite. Insurance/Certification information:  [facility] [employer]  Referring Physician:  Glenna Carrera PA-C  Date of Surgery/Injury: Oct 6th// Oct 6th      ( 8 weeks post op)  Plan of care signed (Y/N):  no  Visit# / total visits:  3/3.  6 session used and the last 3 approved used this week. Pain Level:  3-4/10 pain in L IF and hand    Subjective: \"My hand is still swollen. I used it to type a little yesterday at work . \"     Objective:  Updated POC to be completed by 2018. INTERVENTION: COMPLETED: SPECIFICS/COMMENTS:   Modality:     US X 7 min.  t scar area back of hand proximally - 3.3 mhz, 50% at .08 intensity   Paraffin dip/MHP x For soft tissue warm -up   AROM:     Finger abduc/adduc X     L hand hook fist X     L hand towel flexion pull  Can do at home also   Beans grasp and release X- 5 min. For finger flex/ext   L MP flex/ext x    L wrist all planes x    L thumb flex/ ext. X    AAROM:               PROM/Stretching:     L wrist all planes - x . L  MP flexion x Active first then passive  Push into yellow putty for passive stretch. L fist with MP flexion first x Active then passive   L hook fist x    Scar Mass/Edema Control:     Retrograde massage  x L hand. digits. Continue with compression sleeves as needed and glove   Gel pad over dorsal scar  To wear with compression sleeve 3 - 6 hours. isotoner glove x fitted with to wear as needed   Gel sleeve x Fitted with to wear on SF to soften her proximal scar begin wearing 30 min to 1 hr.   Scar massage x All scar areas with lotion and without.     Strengthening:     Yellow sponge X- 2 sets of 10 Grasp and release for pumping fluid        Other:     Splint- flexion glove x Fitted with today - to wear for prolonged stretch 15 min to 1 hr as tolerated. To try the longer stretch   HEP x Added finger MP flex A and PROM, yellow sponge squeeze, finger add and abduc, and use hand for light functional tasks at home. Assessment/Comments: Pt is making Good progress toward stated plan of care. Pt appears to understand new instructions for her HEP and flexion glove wearing schedule. Re- Assessment:  AROM:       11/30/2018 12/7/2018    Wrist flexion         15*                  40*                 50*    Wrist extension    40*                   45*                 52*    RD                          15*                   16*                Same     UD                         25*                     30*                 30                                                                                                                 With Full fisting. Digital flexion    MP Flex   12/7/2018                   PIP flex            DIP flex                 MP flex      PIP Flex    ( Rest of measurements unchanged from last week. )    IF                      45* - 60*         60*                        77* - 77*            38* - 45*                  46*              72*    MF                    46*  - 57*        60*                        72* - 78*            35* - 45*                  52*              84*    RF                    37* - 55*          62*                        70* - 75*           20* - 41*                  50*               85*    SF                     44* - 55*         63*                         64*   - 65*         35* - 43*                  55*              68*  Opposition is full. Edema:       Continues moderate in her L hand and digits. Pt continues to use compression sleeve and ice and mobility helps with decreasing edema.    ADL-   Pt is now beginning to use for light functional

## 2018-12-11 ENCOUNTER — HOSPITAL ENCOUNTER (OUTPATIENT)
Dept: OCCUPATIONAL THERAPY | Age: 53
Setting detail: THERAPIES SERIES
Discharge: HOME | End: 2018-12-11
Payer: COMMERCIAL

## 2018-12-11 PROCEDURE — 97018 PARAFFIN BATH THERAPY: CPT | Performed by: OCCUPATIONAL THERAPIST

## 2018-12-11 PROCEDURE — 97140 MANUAL THERAPY 1/> REGIONS: CPT | Performed by: OCCUPATIONAL THERAPIST

## 2018-12-11 PROCEDURE — 97530 THERAPEUTIC ACTIVITIES: CPT | Performed by: OCCUPATIONAL THERAPIST

## 2018-12-14 ENCOUNTER — HOSPITAL ENCOUNTER (OUTPATIENT)
Dept: OCCUPATIONAL THERAPY | Age: 53
Setting detail: THERAPIES SERIES
Discharge: HOME | End: 2018-12-14
Payer: COMMERCIAL
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
## 2018-12-14 DIAGNOSIS — S61.452D DOG BITE OF LEFT HAND, SUBSEQUENT ENCOUNTER: ICD-10-CM

## 2018-12-14 PROCEDURE — 97530 THERAPEUTIC ACTIVITIES: CPT | Performed by: OCCUPATIONAL THERAPIST

## 2018-12-14 PROCEDURE — 97140 MANUAL THERAPY 1/> REGIONS: CPT | Performed by: OCCUPATIONAL THERAPIST

## 2018-12-14 PROCEDURE — 97035 APP MDLTY 1+ULTRASOUND EA 15: CPT | Performed by: OCCUPATIONAL THERAPIST

## 2018-12-14 RX ORDER — HYDROCODONE BITARTRATE AND ACETAMINOPHEN 5; 325 MG/1; MG/1
1 TABLET ORAL EVERY 12 HOURS PRN
Qty: 14 TABLET | Refills: 0 | Status: SHIPPED | OUTPATIENT
Start: 2018-12-14 | End: 2018-12-21

## 2018-12-14 NOTE — TELEPHONE ENCOUNTER
Received a call from [facility] [phone] asking for a refill on [facility] [phone]. She uses [facility] in [facility].

## 2018-12-18 ENCOUNTER — HOSPITAL ENCOUNTER (OUTPATIENT)
Dept: OCCUPATIONAL THERAPY | Age: 53
Setting detail: THERAPIES SERIES
Discharge: HOME | End: 2018-12-18
Payer: COMMERCIAL

## 2018-12-18 PROCEDURE — 97140 MANUAL THERAPY 1/> REGIONS: CPT | Performed by: OCCUPATIONAL THERAPIST

## 2018-12-18 PROCEDURE — 97530 THERAPEUTIC ACTIVITIES: CPT | Performed by: OCCUPATIONAL THERAPIST

## 2018-12-18 PROCEDURE — 97035 APP MDLTY 1+ULTRASOUND EA 15: CPT | Performed by: OCCUPATIONAL THERAPIST

## 2018-12-18 NOTE — PROGRESS NOTES
Occupational Therapy    OCCUPATIONAL THERAPY PROGRESS NOTE      Date:  2018                          Initial Evaluation Date: 10/24/2018    Patient Name:  Deanna Viveros                  :  1965     Restrictions/Precautions:  Per protocol, low fall risk  Diagnosis:  Ext tendon laceration/ Dog bite. Insurance/Certification information:  [facility]  Referring Physician:  Jaiden Sparrow PA-C  Date of Surgery/Injury: Oct 6th// Oct 6th      ( 8 weeks post op)  Plan of care signed (Y/N):  no  Visit# / total visits:  3 /8  ( approved for 8 more sessions dated from 2018 till )    Pain Level:  3-4/10 pain in L IF and hand. With PROM can get up to 6-7/10. Subjective: \"My hand has really been bothering me at night - waking me up with pain. So last night I wore that hand brace and took my pain pill. Even though I was stiff in the am I slept through the night . \"     Objective:  Updated POC to be completed by 2019. INTERVENTION: COMPLETED: SPECIFICS/COMMENTS:   Modality:     US X- 7 min.  t scar area back of hand proximally - 3.3 mhz, 50% at .08 intensity   Paraffin dip/MHP x For soft tissue warm -up. Did initially for soft tissue warm up and then again with her hands wrapped in flexion after 45 min. - left on for 5 min. Roxana Moore AROM:     Finger abduc/adduc     L hand hook fist X     L hand towel flexion pull  Can do at home also   Beans grasp and release - 5 min. For finger flex/ext. Small grasp to make tight fist   L MP flex/ext x    L wrist all planes     L thumb flex/ ext. AAROM:               PROM/Stretching:     L wrist all planes - x . L  MP flexion x Active first then passive  Push into yellow putty for passive stretch. L fist with MP flexion first x Active then passive. Hold MP's down while bending IP's. All with resistance into extension.    L hook fist/ full fist x With contract and relax in extension   Scar

## 2018-12-21 ENCOUNTER — HOSPITAL ENCOUNTER (OUTPATIENT)
Dept: OCCUPATIONAL THERAPY | Age: 53
Setting detail: THERAPIES SERIES
Discharge: HOME | End: 2018-12-21
Payer: COMMERCIAL

## 2018-12-21 PROCEDURE — 97140 MANUAL THERAPY 1/> REGIONS: CPT

## 2018-12-21 PROCEDURE — 97530 THERAPEUTIC ACTIVITIES: CPT

## 2018-12-21 PROCEDURE — 97035 APP MDLTY 1+ULTRASOUND EA 15: CPT

## 2018-12-21 NOTE — PROGRESS NOTES
wear as needed   Gel sleeve x Fitted with to wear on SF to soften her proximal scar begin wearing 30 min to 1 hr.   Scar massage x All scar areas with lotion and without. Strengthenin# wt X 20 rep's each way Grasp in wrist ext and relax in flexion- pull fingers in. GE followed gravity added   Yellow sponge X- 2 sets of 10 Grasp and release for pumping fluid   Yellow putty  x Using at home   Other:     Splint- flexion glove x   To wear for prolonged stretch 30 min to 1 hr as tolerated. To try the longer stretch   HEP x finger MP flex A and PROM, yellow sponge squeeze, finger add and abduc, and use hand for light functional tasks at home. Assessment/Comments: Pt is making Good progress toward stated plan of care. Pt to try and cont wearing the splint with her isotoner glove & gel pad at night and to ice before bed. Tolerated session well. Completed increasing gentle aggressiveness to scar area. -Rehab Potential: Good  -Requires OT Follow Up: Yes  Time In:  9:00 am            Time Out: 10:12 am             Visit #: 4    Treatment Charges: Mins Units   Modalities/ US/parraffin 10/7 1   Ther Exercise     Manual Therapy 30 2   Thera Activities 15 1   ADL/Home Mgt      Neuro Re-education     Gait Training     Group Therapy     Non-Billable Service Time- ice 10    Other/splint      Total Time/Units 72 4       -Response to Treatment: Pt continues to lowe with stiffness and increased edema although she tries different techniques daily. She works all day and has ^'ed pain by the night. She responded well with therapy today. She is to try more icing and or heat at home and new night routine - see above. Goals: Goals for pt can be see on initial eval occurring on 10/24/2018,  and on 2018. GOALS (Long term same as Short term):  1) Patient will demonstrate good understanding of home program(exercises/activities/diagnosis/prognosis/goals) with good accuracy. Goal in progress.

## 2018-12-27 ASSESSMENT — ENCOUNTER SYMPTOMS
VOICE CHANGE: 0
ABDOMINAL PAIN: 0
CONSTIPATION: 0
BLOOD IN STOOL: 0
EYE DISCHARGE: 0
ABDOMINAL DISTENTION: 0
NAUSEA: 0
SORE THROAT: 0
SHORTNESS OF BREATH: 0
SINUS PRESSURE: 0
EYE ITCHING: 0
VOMITING: 0
CHOKING: 0
CHEST TIGHTNESS: 0
DIARRHEA: 0
TROUBLE SWALLOWING: 0
WHEEZING: 0
COUGH: 0
BACK PAIN: 0
SINUS PAIN: 0
RHINORRHEA: 0

## 2018-12-28 ENCOUNTER — HOSPITAL ENCOUNTER (OUTPATIENT)
Dept: OCCUPATIONAL THERAPY | Age: 53
Setting detail: THERAPIES SERIES
Discharge: HOME | End: 2018-12-28
Payer: COMMERCIAL

## 2018-12-28 PROCEDURE — 97035 APP MDLTY 1+ULTRASOUND EA 15: CPT | Performed by: OCCUPATIONAL THERAPIST

## 2018-12-28 PROCEDURE — 97140 MANUAL THERAPY 1/> REGIONS: CPT | Performed by: OCCUPATIONAL THERAPIST

## 2018-12-28 PROCEDURE — 97530 THERAPEUTIC ACTIVITIES: CPT | Performed by: OCCUPATIONAL THERAPIST

## 2018-12-28 NOTE — PROGRESS NOTES
Occupational Therapy    OCCUPATIONAL THERAPY PROGRESS NOTE      Date:  2018                          Initial Evaluation Date: 10/24/2018    Patient Name:  Adali Lange                  :  1965     Restrictions/Precautions:  Per protocol, low fall risk  Diagnosis:  Ext tendon laceration/ Dog bite. Insurance/Certification information:  [facility]  Referring Physician:  Justo Candelario PA-C  Date of Surgery/Injury: Oct 6th// Oct 6th      ( 8 weeks post op)  Plan of care signed (Y/N):  no  Visit# / total visits:  /8  ( approved for 8 more sessions dated from 2018 till )    Pain Level:  3-4/10 pain in L IF and hand. With PROM can get up to 5-6/10. Subjective: \" I'm using my L hand pretty good now - I just can't make a fist and I don't have a lot of strength. \"     Objective:  Updated POC to be completed by 2019. INTERVENTION: COMPLETED: SPECIFICS/COMMENTS:   Modality:     fluiotherapy X- 15 min. For soft tissue warm up while intermittently moving her fingers and wrist   US X- 7 min.  t scar area back of hand proximally - 3.3 mhz, 50% at .08 intensity   Paraffin dip/MHP  For soft tissue warm -up. Did initially for soft tissue warm up - wrapped in flexion   AROM:     Finger abduc/adduc     L hand hook fist X     L hand towel flexion pull  Can do at home also   Beans grasp and release x- 5 min. For finger flex/ext. Small grasp to make tight fist   L MP flex/ext x    L wrist all planes     L thumb flex/ ext. AAROM:               PROM/Stretching:     L wrist all planes - x . L  MP flexion x Active first then passive  Push into yellow putty for passive stretch. L fist with MP flexion first x Active then passive. Hold MP's down while bending IP's. All with resistance into extension. L hook fist/ full fist x With contract and relax in extension   Scar Mass/Edema Control:     Retrograde massage  x L hand. digits.  Continue

## 2018-12-31 ENCOUNTER — HOSPITAL ENCOUNTER (OUTPATIENT)
Dept: OCCUPATIONAL THERAPY | Age: 53
Setting detail: THERAPIES SERIES
Discharge: HOME | End: 2018-12-31
Payer: COMMERCIAL

## 2018-12-31 PROCEDURE — 97022 WHIRLPOOL THERAPY: CPT | Performed by: OCCUPATIONAL THERAPIST

## 2018-12-31 PROCEDURE — 97140 MANUAL THERAPY 1/> REGIONS: CPT | Performed by: OCCUPATIONAL THERAPIST

## 2018-12-31 PROCEDURE — 97110 THERAPEUTIC EXERCISES: CPT | Performed by: OCCUPATIONAL THERAPIST

## 2019-01-04 ENCOUNTER — HOSPITAL ENCOUNTER (OUTPATIENT)
Dept: OCCUPATIONAL THERAPY | Age: 54
Setting detail: THERAPIES SERIES
Discharge: HOME | End: 2019-01-04
Payer: COMMERCIAL

## 2019-01-04 PROCEDURE — 97530 THERAPEUTIC ACTIVITIES: CPT | Performed by: OCCUPATIONAL THERAPIST

## 2019-01-04 PROCEDURE — 97022 WHIRLPOOL THERAPY: CPT | Performed by: OCCUPATIONAL THERAPIST

## 2019-01-04 PROCEDURE — 97140 MANUAL THERAPY 1/> REGIONS: CPT | Performed by: OCCUPATIONAL THERAPIST

## 2019-01-07 ENCOUNTER — APPOINTMENT (OUTPATIENT)
Dept: OCCUPATIONAL THERAPY | Age: 54
End: 2019-01-07
Payer: COMMERCIAL

## 2019-01-08 ENCOUNTER — HOSPITAL ENCOUNTER (OUTPATIENT)
Dept: GENERAL RADIOLOGY | Age: 54
Discharge: HOME | End: 2019-01-10
Payer: COMMERCIAL

## 2019-01-08 ENCOUNTER — OFFICE VISIT (OUTPATIENT)
Dept: BREAST CENTER | Age: 54
End: 2019-01-08
Payer: COMMERCIAL

## 2019-01-08 VITALS
HEIGHT: 69 IN | WEIGHT: 183 LBS | OXYGEN SATURATION: 98 % | TEMPERATURE: 98.2 F | HEART RATE: 58 BPM | DIASTOLIC BLOOD PRESSURE: 80 MMHG | RESPIRATION RATE: 16 BRPM | BODY MASS INDEX: 27.11 KG/M2 | SYSTOLIC BLOOD PRESSURE: 118 MMHG

## 2019-01-08 DIAGNOSIS — Z12.31 SCREENING MAMMOGRAM, ENCOUNTER FOR: Primary | ICD-10-CM

## 2019-01-08 DIAGNOSIS — N63.20 LEFT BREAST MASS: ICD-10-CM

## 2019-01-08 PROCEDURE — 76642 ULTRASOUND BREAST LIMITED: CPT

## 2019-01-08 PROCEDURE — 99213 OFFICE O/P EST LOW 20 MIN: CPT | Performed by: NURSE PRACTITIONER

## 2019-01-11 ENCOUNTER — HOSPITAL ENCOUNTER (OUTPATIENT)
Dept: OCCUPATIONAL THERAPY | Age: 54
Setting detail: THERAPIES SERIES
Discharge: HOME | End: 2019-01-11
Payer: COMMERCIAL

## 2019-01-11 PROCEDURE — 97022 WHIRLPOOL THERAPY: CPT | Performed by: OCCUPATIONAL THERAPIST

## 2019-01-11 PROCEDURE — 97530 THERAPEUTIC ACTIVITIES: CPT | Performed by: OCCUPATIONAL THERAPIST

## 2019-01-11 PROCEDURE — 97140 MANUAL THERAPY 1/> REGIONS: CPT | Performed by: OCCUPATIONAL THERAPIST

## 2019-01-17 ENCOUNTER — OFFICE VISIT (OUTPATIENT)
Dept: ORTHOPEDIC SURGERY | Age: 54
End: 2019-01-17
Payer: COMMERCIAL

## 2019-01-17 VITALS
HEIGHT: 68 IN | WEIGHT: 180 LBS | HEART RATE: 59 BPM | TEMPERATURE: 98 F | DIASTOLIC BLOOD PRESSURE: 73 MMHG | SYSTOLIC BLOOD PRESSURE: 107 MMHG | BODY MASS INDEX: 27.28 KG/M2

## 2019-01-17 DIAGNOSIS — S61.452D DOG BITE OF LEFT HAND, SUBSEQUENT ENCOUNTER: Primary | ICD-10-CM
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
## 2019-01-17 PROCEDURE — 99212 OFFICE O/P EST SF 10 MIN: CPT

## 2019-01-17 PROCEDURE — 99213 OFFICE O/P EST LOW 20 MIN: CPT | Performed by: ORTHOPAEDIC SURGERY

## 2019-01-18 ENCOUNTER — HOSPITAL ENCOUNTER (OUTPATIENT)
Dept: OCCUPATIONAL THERAPY | Age: 54
Setting detail: THERAPIES SERIES
Discharge: HOME | End: 2019-01-18
Payer: COMMERCIAL

## 2019-01-18 PROCEDURE — 97140 MANUAL THERAPY 1/> REGIONS: CPT | Performed by: OCCUPATIONAL THERAPIST

## 2019-01-18 PROCEDURE — 97035 APP MDLTY 1+ULTRASOUND EA 15: CPT | Performed by: OCCUPATIONAL THERAPIST

## 2019-01-18 PROCEDURE — 97530 THERAPEUTIC ACTIVITIES: CPT | Performed by: OCCUPATIONAL THERAPIST

## 2019-01-25 ENCOUNTER — HOSPITAL ENCOUNTER (OUTPATIENT)
Dept: OCCUPATIONAL THERAPY | Age: 54
Setting detail: THERAPIES SERIES
Discharge: HOME | End: 2019-01-25
Payer: COMMERCIAL

## 2019-01-25 PROCEDURE — 97022 WHIRLPOOL THERAPY: CPT | Performed by: OCCUPATIONAL THERAPIST

## 2019-01-25 PROCEDURE — 97530 THERAPEUTIC ACTIVITIES: CPT | Performed by: OCCUPATIONAL THERAPIST

## 2019-01-25 PROCEDURE — 97140 MANUAL THERAPY 1/> REGIONS: CPT | Performed by: OCCUPATIONAL THERAPIST

## 2019-02-01 ENCOUNTER — HOSPITAL ENCOUNTER (OUTPATIENT)
Dept: OCCUPATIONAL THERAPY | Age: 54
Setting detail: THERAPIES SERIES
Discharge: HOME | End: 2019-02-01
Payer: COMMERCIAL

## 2019-02-01 PROCEDURE — 97530 THERAPEUTIC ACTIVITIES: CPT | Performed by: OCCUPATIONAL THERAPIST

## 2019-02-01 PROCEDURE — 97140 MANUAL THERAPY 1/> REGIONS: CPT | Performed by: OCCUPATIONAL THERAPIST

## 2019-02-01 PROCEDURE — 97022 WHIRLPOOL THERAPY: CPT | Performed by: OCCUPATIONAL THERAPIST

## 2019-02-08 ENCOUNTER — HOSPITAL ENCOUNTER (OUTPATIENT)
Dept: OCCUPATIONAL THERAPY | Age: 54
Setting detail: THERAPIES SERIES
Discharge: HOME | End: 2019-02-08
Payer: COMMERCIAL

## 2019-02-08 PROCEDURE — 97140 MANUAL THERAPY 1/> REGIONS: CPT | Performed by: OCCUPATIONAL THERAPIST

## 2019-02-08 PROCEDURE — 97110 THERAPEUTIC EXERCISES: CPT | Performed by: OCCUPATIONAL THERAPIST

## 2019-02-08 PROCEDURE — G8986 CARRY D/C STATUS: HCPCS | Performed by: OCCUPATIONAL THERAPIST

## 2019-02-08 PROCEDURE — G8985 CARRY GOAL STATUS: HCPCS | Performed by: OCCUPATIONAL THERAPIST

## 2019-02-08 PROCEDURE — 97022 WHIRLPOOL THERAPY: CPT | Performed by: OCCUPATIONAL THERAPIST

## 2019-04-18 ENCOUNTER — OFFICE VISIT (OUTPATIENT)
Dept: ORTHOPEDIC SURGERY | Age: 54
End: 2019-04-18
Payer: COMMERCIAL

## 2019-04-18 VITALS
WEIGHT: 180 LBS | SYSTOLIC BLOOD PRESSURE: 131 MMHG | HEIGHT: 69 IN | BODY MASS INDEX: 26.66 KG/M2 | DIASTOLIC BLOOD PRESSURE: 79 MMHG | HEART RATE: 65 BPM

## 2019-04-18 DIAGNOSIS — S61.452D DOG BITE OF LEFT HAND, SUBSEQUENT ENCOUNTER: Primary | ICD-10-CM
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
## 2019-04-18 PROCEDURE — 99212 OFFICE O/P EST SF 10 MIN: CPT

## 2019-04-18 PROCEDURE — 99212 OFFICE O/P EST SF 10 MIN: CPT | Performed by: PHYSICIAN ASSISTANT

## 2019-04-18 NOTE — PATIENT INSTRUCTIONS
Continue with your home exercise program  Heat before and ice after  OTC tylenol or ibuprofen as needed for pain  Follow up on an as needed basis- call with any questions or concerns

## 2019-04-18 NOTE — PROGRESS NOTES
speech and dress appropriate for noted age, affect euthymic. Extremity:  Left Upper Extremity  Skin is clean dry and intact without any signs of infection  Mild edema noted residual to index formation. Radial pulse palpable, fingers warm with BCR  Flex/extension intact to wrist, thumb and fingers  Patient lacks approximately 3-5 degrees of flexion at index finger MCP joint. Finger opposition intact  Finger adduction/abduction intact  Finger crossover intact  Subjectively states sensation intact to radial/medial/ulnar distribution. Incision well healed with no redness, drainage or warmth. No tenderness at healed scars. Keloid appearance to index finger palmar scar improved, no longer hypersensitive    /79 (Site: Left Upper Arm, Position: Sitting, Cuff Size: Large Adult)   Pulse 65   Ht 5' 9\" (1.753 m)   Wt 180 lb (81.6 kg)   BMI 26.58 kg/m²     XR:   No XR obtained today    Assessment:   Diagnosis Orders   1. Dog bite of left hand, subsequent encounter         Plan:  Weightbearing as tolerated on LUE  Continue with HEP, encouraged to use heat before passive ROM or static stretching and ice after for soreness or swelling  Patient can continue with over-the-counter Tylenol or ibuprofen as needed for soreness. She would like to return. additional therapy she just needs to let us know, she follow-up with our office on an as-needed basis at this time    Electronically signed by Ajay Sy PA-C on 4/18/2019 at 11:45 AM

## 2019-07-18 ENCOUNTER — HOSPITAL ENCOUNTER (OUTPATIENT)
Dept: GENERAL RADIOLOGY | Age: 54
Discharge: HOME | End: 2019-07-20
Payer: COMMERCIAL

## 2019-07-18 DIAGNOSIS — Z12.31 SCREENING MAMMOGRAM, ENCOUNTER FOR: ICD-10-CM

## 2019-07-18 PROCEDURE — 77063 BREAST TOMOSYNTHESIS BI: CPT

## 2023-04-12 NOTE — PROGRESS NOTES
Occupational Therapy    OCCUPATIONAL THERAPY PROGRESS NOTE    RE-  ASSESSMENT    Date:  2018                          Initial Evaluation Date: 10/24/2018    Patient Name:  Corey Soler                  :  1965     Restrictions/Precautions:  Per protocol, low fall risk  Diagnosis:  Ext tendon laceration/ Dog bite. Insurance/Certification information:  [facility]  Referring Physician:  Rajani Reese PA-C  Date of Surgery/Injury: Oct 6th// Oct 6th      ( 8 weeks post op)  Plan of care signed (Y/N):  no  Visit# / total visits: -    Pain Level:  3-4/10 pain in L IF and hand    Subjective: \"My hand is still swollen. I used it to type a little yesterday at work . \"     Objective:  Updated POC to be completed by 2018. INTERVENTION: COMPLETED: SPECIFICS/COMMENTS:   Modality:     US X 7 min.  t scar area back of hand proximally - 3.3 mhz, 20% at .08 intensity   Paraffin dip/MHP x For soft tissue warm -up   AROM:     Finger abduc/adduc X     L hand hook fist X     L hand towel flexion pull x Can do at home also   Beans grasp and release X- 5 min. For finger flex/ext   L MP flex/ext x    L wrist all planes x    L thumb flex/ ext. X    AAROM:               PROM/Stretching:     L wrist all planes - x . L  MP flexion x Active first then passive   L fist with MP flexion first x Active then passive   L hook fist x    Scar Mass/Edema Control:     Retrograde massage  x L hand. digits. Continue with compression sleeves as needed and glove   Gel pad over dorsal scar x To wear with compression sleeve 3 - 6 hours. isotoner glove x fitted with to wear as needed   Gel sleeve x Fitted with to wear on SF to soften her proximal scar begin wearing 30 min to 1 hr.   Scar massage x All scar areas with lotion and without.     Strengthening:     Yellow sponge X- 2 sets of 10 Grasp and release for pumping fluid        Other:     splint x To wear at night only.  Can put on a few hrs during the day if needed. HEP x Added finger MP flex A and PROM, yellow sponge squeeze, finger add and abduc, and use hand for light functional tasks at home. Assessment/Comments: Pt is making Good progress toward stated plan of care. Pt appears to understand new instructions for her HEP and splint wearing schedule. Re- Assessment:  AROM:       11/30/2018    Wrist flexion         15*                  40*    Wrist extension    40*                   45*    RD                          15*                   16*    UD                         25*                     30*                                                                                                                 With Full fisting. Digital flexion    MP Flex              PIP flex            DIP flex                 MP flex      PIP Flex          IF                      45* - 60*               77* - 77*            38* - 45*                  46*              72*    MF                    46*  - 57*              72* - 78*            35* - 45*                  52*              84*    RF                    37* - 55*                70* - 75*           20* - 41*                  50*               85*    SF                     44* - 55*                64*   - 65*         35* - 43*                  55*              68*  Opposition is full. Edema:       Continues moderate in her L hand and digits. Pt continues to use compression sleeve and ice and mobility helps with decreasing edema. ADL-   Pt is now beginning to use for light functional tasks.   She is using to type at work for short periods.     -Rehab Potential: Good  -Requires OT Follow Up: Yes  Time In:  8:00 am            Time Out: 9:15 am             Visit #: 6    Treatment Charges: Mins Units   Modalities/ /christina 7/10 1   Ther Exercise     Manual Therapy 30 2   Thera Activities 15 1   ADL/Home Mgt      Neuro Re-education     Gait Training     Group Therapy 0

## 2023-07-13 ENCOUNTER — HOSPITAL ENCOUNTER (OUTPATIENT)
Age: 58
Discharge: HOME | End: 2023-07-15

## 2023-07-20 ENCOUNTER — HOSPITAL ENCOUNTER (OUTPATIENT)
Dept: GENERAL RADIOLOGY | Age: 58
Discharge: HOME | End: 2023-07-22
Payer: COMMERCIAL

## 2023-07-20 DIAGNOSIS — R92.8 ABNORMAL MAMMOGRAM OF LEFT BREAST: ICD-10-CM

## 2023-07-20 PROCEDURE — G0279 TOMOSYNTHESIS, MAMMO: HCPCS

## 2023-07-20 PROCEDURE — 77065 DX MAMMO INCL CAD UNI: CPT

## 2023-07-20 PROCEDURE — 76642 ULTRASOUND BREAST LIMITED: CPT

## 2024-07-19 ENCOUNTER — HOSPITAL ENCOUNTER (OUTPATIENT)
Dept: GENERAL RADIOLOGY | Age: 59
Discharge: HOME | End: 2024-07-19
Payer: COMMERCIAL

## 2024-07-19 VITALS — WEIGHT: 185 LBS | HEIGHT: 68 IN | BODY MASS INDEX: 28.04 KG/M2

## 2024-07-19 DIAGNOSIS — Z12.31 VISIT FOR SCREENING MAMMOGRAM: ICD-10-CM

## 2024-07-19 PROCEDURE — 77063 BREAST TOMOSYNTHESIS BI: CPT

## 2024-07-22 ENCOUNTER — CLINICAL DOCUMENTATION (OUTPATIENT)
Dept: GENERAL RADIOLOGY | Age: 59
End: 2024-07-22

## 2024-07-22 NOTE — PROGRESS NOTES
Mammogram clinical report provided to patient. Report sent to Dr. Aroldo Martins as per patient's request.

## 2024-08-26 ENCOUNTER — OFFICE VISIT (OUTPATIENT)
Age: 59
End: 2024-08-26
Payer: COMMERCIAL

## 2024-08-26 VITALS
BODY MASS INDEX: 27.28 KG/M2 | SYSTOLIC BLOOD PRESSURE: 114 MMHG | HEIGHT: 68 IN | RESPIRATION RATE: 18 BRPM | DIASTOLIC BLOOD PRESSURE: 68 MMHG | WEIGHT: 180 LBS | OXYGEN SATURATION: 96 % | TEMPERATURE: 98.7 F | HEART RATE: 67 BPM

## 2024-08-26 DIAGNOSIS — Z00.00 WELL ADULT EXAM: Primary | ICD-10-CM

## 2024-08-26 LAB
BILIRUBIN, POC: NEGATIVE
BLOOD URINE, POC: NORMAL
CLARITY, POC: CLEAR
COLOR, POC: NORMAL
GLUCOSE URINE, POC: NEGATIVE
KETONES, POC: NEGATIVE
LEUKOCYTE EST, POC: NEGATIVE
NITRITE, POC: NEGATIVE
PH, POC: 6
PROTEIN, POC: NEGATIVE
SPECIFIC GRAVITY, POC: 1.03
UROBILINOGEN, POC: NORMAL

## 2024-08-26 PROCEDURE — 81002 URINALYSIS NONAUTO W/O SCOPE: CPT | Performed by: FAMILY MEDICINE

## 2024-08-26 PROCEDURE — 99396 PREV VISIT EST AGE 40-64: CPT | Performed by: FAMILY MEDICINE

## 2024-08-26 SDOH — ECONOMIC STABILITY: INCOME INSECURITY: HOW HARD IS IT FOR YOU TO PAY FOR THE VERY BASICS LIKE FOOD, HOUSING, MEDICAL CARE, AND HEATING?: NOT HARD AT ALL

## 2024-08-26 SDOH — ECONOMIC STABILITY: FOOD INSECURITY: WITHIN THE PAST 12 MONTHS, YOU WORRIED THAT YOUR FOOD WOULD RUN OUT BEFORE YOU GOT MONEY TO BUY MORE.: NEVER TRUE

## 2024-08-26 SDOH — ECONOMIC STABILITY: FOOD INSECURITY: WITHIN THE PAST 12 MONTHS, THE FOOD YOU BOUGHT JUST DIDN'T LAST AND YOU DIDN'T HAVE MONEY TO GET MORE.: NEVER TRUE

## 2024-08-26 ASSESSMENT — PATIENT HEALTH QUESTIONNAIRE - PHQ9
SUM OF ALL RESPONSES TO PHQ9 QUESTIONS 1 & 2: 0
SUM OF ALL RESPONSES TO PHQ QUESTIONS 1-9: 0
2. FEELING DOWN, DEPRESSED OR HOPELESS: NOT AT ALL
SUM OF ALL RESPONSES TO PHQ QUESTIONS 1-9: 0
SUM OF ALL RESPONSES TO PHQ QUESTIONS 1-9: 0
1. LITTLE INTEREST OR PLEASURE IN DOING THINGS: NOT AT ALL
SUM OF ALL RESPONSES TO PHQ QUESTIONS 1-9: 0

## 2024-08-29 DIAGNOSIS — R53.83 OTHER FATIGUE: ICD-10-CM

## 2024-08-29 DIAGNOSIS — E78.5 HYPERLIPIDEMIA, UNSPECIFIED HYPERLIPIDEMIA TYPE: ICD-10-CM

## 2024-08-29 DIAGNOSIS — Z00.00 WELL ADULT EXAM: Primary | ICD-10-CM

## 2024-08-29 NOTE — PROGRESS NOTES
Lakshmi Palm (:  1965) is a 59 y.o. female,Established patient, here for evaluation of the following chief complaint(s):  Annual Exam (Yearly. )         Assessment & Plan  Well adult exam       Orders:    POCT Urinalysis no Micro      Return in about 1 year (around 2025).       Subjective   HPI  59-year-old comes in for yearly fasting physical.  She is currently on no medications.  She has had previously mildly elevated lipids.  She previously had a hysterectomy.  Ovaries remain.  Colonoscopy last year showed a tubular adenoma she will be due again in .  Her mother also had colon cancer.  Mammogram this July was normal mammogram will be due in .  Mother also had ovarian cancer.  I urged the patient to schedule appointment with her gynecologist.  Eye exams are up-to-date.  She denies any significant symptoms although she is under a good amount of stress her  is sick with myelodysplasia and is not doing well.  Review of Systems   All other systems reviewed and are negative.         Objective   Physical Exam  Vitals reviewed.   Constitutional:       General: She is not in acute distress.     Appearance: Normal appearance. She is not ill-appearing or toxic-appearing.   HENT:      Head: Normocephalic and atraumatic.      Right Ear: Tympanic membrane and ear canal normal.      Left Ear: Tympanic membrane and ear canal normal.      Mouth/Throat:      Mouth: Mucous membranes are moist.      Pharynx: Oropharynx is clear.   Eyes:      General: No scleral icterus.     Extraocular Movements: Extraocular movements intact.      Conjunctiva/sclera: Conjunctivae normal.      Pupils: Pupils are equal, round, and reactive to light.   Neck:      Vascular: No carotid bruit.   Cardiovascular:      Rate and Rhythm: Normal rate and regular rhythm.      Pulses: Normal pulses.      Heart sounds: Normal heart sounds. No murmur heard.  Pulmonary:      Effort: Pulmonary effort is normal.      Breath sounds:  Normal breath sounds.   Abdominal:      General: Bowel sounds are normal.      Palpations: Abdomen is soft. There is no mass.      Tenderness: There is no abdominal tenderness.   Musculoskeletal:         General: Normal range of motion.      Cervical back: Normal range of motion and neck supple. No tenderness.      Right lower leg: No edema.      Left lower leg: No edema.   Lymphadenopathy:      Cervical: No cervical adenopathy.   Skin:     General: Skin is warm and dry.      Coloration: Skin is not jaundiced or pale.   Neurological:      General: No focal deficit present.      Mental Status: She is alert and oriented to person, place, and time. Mental status is at baseline.   Psychiatric:         Mood and Affect: Mood normal.         Behavior: Behavior normal.         Thought Content: Thought content normal.         Judgment: Judgment normal.                  An electronic signature was used to authenticate this note.    --Aroldo Martins MD     Note: This report was transcribed using Dragon voice recognition software.  Every effort was made to ensure accuracy, however inadvertent computerized transcription errors may be present.

## 2024-09-28 PROBLEM — Z00.00 WELL ADULT EXAM: Status: RESOLVED | Noted: 2024-08-29 | Resolved: 2024-09-28

## 2024-12-17 ENCOUNTER — TELEPHONE (OUTPATIENT)
Age: 59
End: 2024-12-17

## 2024-12-17 NOTE — TELEPHONE ENCOUNTER
Patient started last night with sorethroat stuffy and headache     Did not test for covid not sure if she needs an apt or rx    Patient phone [phone]  
.

## 2024-12-18 ENCOUNTER — OFFICE VISIT (OUTPATIENT)
Age: 59
End: 2024-12-18
Payer: COMMERCIAL

## 2024-12-18 VITALS
HEART RATE: 64 BPM | TEMPERATURE: 98.8 F | RESPIRATION RATE: 18 BRPM | BODY MASS INDEX: 23.79 KG/M2 | WEIGHT: 157 LBS | DIASTOLIC BLOOD PRESSURE: 72 MMHG | HEIGHT: 68 IN | SYSTOLIC BLOOD PRESSURE: 110 MMHG | OXYGEN SATURATION: 98 %

## 2024-12-18 DIAGNOSIS — J32.9 SINUSITIS, UNSPECIFIED CHRONICITY, UNSPECIFIED LOCATION: Primary | ICD-10-CM

## 2024-12-18 PROCEDURE — 3017F COLORECTAL CA SCREEN DOC REV: CPT | Performed by: FAMILY MEDICINE

## 2024-12-18 PROCEDURE — G8484 FLU IMMUNIZE NO ADMIN: HCPCS | Performed by: FAMILY MEDICINE

## 2024-12-18 PROCEDURE — 99213 OFFICE O/P EST LOW 20 MIN: CPT | Performed by: FAMILY MEDICINE

## 2024-12-18 PROCEDURE — G8428 CUR MEDS NOT DOCUMENT: HCPCS | Performed by: FAMILY MEDICINE

## 2024-12-18 PROCEDURE — 1036F TOBACCO NON-USER: CPT | Performed by: FAMILY MEDICINE

## 2024-12-18 PROCEDURE — G9899 SCRN MAM PERF RSLTS DOC: HCPCS | Performed by: FAMILY MEDICINE

## 2024-12-18 PROCEDURE — G8420 CALC BMI NORM PARAMETERS: HCPCS | Performed by: FAMILY MEDICINE

## 2024-12-18 RX ORDER — CEFDINIR 300 MG/1
300 CAPSULE ORAL 2 TIMES DAILY
Qty: 20 CAPSULE | Refills: 0 | Status: SHIPPED | OUTPATIENT
Start: 2024-12-18 | End: 2024-12-28

## 2024-12-18 ASSESSMENT — ENCOUNTER SYMPTOMS
SORE THROAT: 1
SINUS PRESSURE: 1
COUGH: 1
SHORTNESS OF BREATH: 0

## 2024-12-18 NOTE — PROGRESS NOTES
rhythm.      Pulses: Normal pulses.      Heart sounds: Normal heart sounds. No murmur heard.  Pulmonary:      Effort: Pulmonary effort is normal.      Breath sounds: Normal breath sounds. No wheezing or rhonchi.   Musculoskeletal:      Cervical back: Normal range of motion and neck supple. No tenderness.   Lymphadenopathy:      Cervical: No cervical adenopathy.   Neurological:      Mental Status: She is alert.                  An electronic signature was used to authenticate this note.    --Aroldo Martins MD     Note: This report was transcribed using Dragon voice recognition software.  Every effort was made to ensure accuracy, however inadvertent computerized transcription errors may be present.

## 2025-08-27 ENCOUNTER — OFFICE VISIT (OUTPATIENT)
Age: 60
End: 2025-08-27
Payer: COMMERCIAL

## 2025-08-27 VITALS
HEART RATE: 61 BPM | OXYGEN SATURATION: 98 % | WEIGHT: 165 LBS | DIASTOLIC BLOOD PRESSURE: 68 MMHG | HEIGHT: 68 IN | TEMPERATURE: 98.4 F | SYSTOLIC BLOOD PRESSURE: 108 MMHG | BODY MASS INDEX: 25.01 KG/M2 | RESPIRATION RATE: 18 BRPM

## 2025-08-27 DIAGNOSIS — R53.83 OTHER FATIGUE: ICD-10-CM

## 2025-08-27 DIAGNOSIS — E78.5 HYPERLIPIDEMIA, UNSPECIFIED HYPERLIPIDEMIA TYPE: ICD-10-CM

## 2025-08-27 DIAGNOSIS — Z00.00 WELL ADULT EXAM: Primary | ICD-10-CM

## 2025-08-27 PROCEDURE — 81002 URINALYSIS NONAUTO W/O SCOPE: CPT | Performed by: FAMILY MEDICINE

## 2025-08-27 PROCEDURE — 99396 PREV VISIT EST AGE 40-64: CPT | Performed by: FAMILY MEDICINE

## 2025-08-27 SDOH — ECONOMIC STABILITY: FOOD INSECURITY: WITHIN THE PAST 12 MONTHS, YOU WORRIED THAT YOUR FOOD WOULD RUN OUT BEFORE YOU GOT MONEY TO BUY MORE.: NEVER TRUE

## 2025-08-27 SDOH — ECONOMIC STABILITY: FOOD INSECURITY: WITHIN THE PAST 12 MONTHS, THE FOOD YOU BOUGHT JUST DIDN'T LAST AND YOU DIDN'T HAVE MONEY TO GET MORE.: NEVER TRUE

## 2025-08-27 ASSESSMENT — PATIENT HEALTH QUESTIONNAIRE - PHQ9
SUM OF ALL RESPONSES TO PHQ QUESTIONS 1-9: 0
2. FEELING DOWN, DEPRESSED OR HOPELESS: NOT AT ALL
1. LITTLE INTEREST OR PLEASURE IN DOING THINGS: NOT AT ALL
SUM OF ALL RESPONSES TO PHQ QUESTIONS 1-9: 0

## (undated) DEVICE — CONTROL SYRINGE LUER-LOCK TIP: Brand: MONOJECT

## (undated) DEVICE — CHLORAPREP 26ML ORANGE

## (undated) DEVICE — READY WET SKIN SCRUB TRAY-LF: Brand: MEDLINE INDUSTRIES, INC.

## (undated) DEVICE — GLOVE ORANGE PI 8   MSG9080

## (undated) DEVICE — SURGICAL PROCEDURE PACK BASIC

## (undated) DEVICE — SOLUTION IV IRRIG WATER 1000ML POUR BRL 2F7114

## (undated) DEVICE — Z DISCONTINUED USE 2275686 GLOVE SURG SZ 8 L12IN FNGR THK13MIL WHT ISOLEX POLYISOPRENE

## (undated) DEVICE — TUBING SUCT 12FR MAL ALUM SHFT FN CAP VENT UNIV CONN W/ OBT

## (undated) DEVICE — HAND SET

## (undated) DEVICE — PADDING,UNDERCAST,COTTON, 3X4YD STERILE: Brand: MEDLINE

## (undated) DEVICE — DRESSING,GAUZE,XEROFORM,CURAD,5"X9",ST: Brand: CURAD

## (undated) DEVICE — BANDAGE COBAN 4 IN COMPR W4INXL5YD FOAM COHESIVE QUIK STK SELF ADH SFT

## (undated) DEVICE — CONVERTORS STOCKINETTE: Brand: CONVERTORS

## (undated) DEVICE — BANDAGE,ELASTIC,ESMARK,STERILE,4"X9',LF: Brand: MEDLINE

## (undated) DEVICE — PATIENT RETURN ELECTRODE, SINGLE-USE, CONTACT QUALITY MONITORING, ADULT, WITH 9FT CORD, FOR PATIENTS WEIGING OVER 33LBS. (15KG): Brand: MEGADYNE

## (undated) DEVICE — INTENDED FOR TISSUE SEPARATION, AND OTHER PROCEDURES THAT REQUIRE A SHARP SURGICAL BLADE TO PUNCTURE OR CUT.: Brand: BARD-PARKER ® STAINLESS STEEL BLADES

## (undated) DEVICE — ZIMMER® STERILE DISPOSABLE TOURNIQUET CUFF WITH PROTECTIVE SLEEVE AND PLC, DUAL PORT, SINGLE BLADDER, 18 IN. (46 CM)

## (undated) DEVICE — GOWN,BREATHABLE SLV,AURORA,XLG,STRL: Brand: MEDLINE

## (undated) DEVICE — TOWEL,OR,DSP,ST,BLUE,DLX,10/PK,8PK/CS: Brand: MEDLINE

## (undated) DEVICE — Z INACTIVE USE 2660664 SOLUTION IRRIG 3000ML 0.9% SOD CHL USP UROMATIC PLAS CONT

## (undated) DEVICE — 3M™ STERI-DRAPE™ U-DRAPE 1015: Brand: STERI-DRAPE™

## (undated) DEVICE — BNDG,ELSTC,MATRIX,STRL,3"X5YD,LF,HOOK&LP: Brand: MEDLINE

## (undated) DEVICE — PADDING,UNDERCAST,COTTON, 4"X4YD STERILE: Brand: MEDLINE

## (undated) DEVICE — STANDARD HYPODERMIC NEEDLE,ALUMINUM HUB: Brand: MONOJECT

## (undated) DEVICE — DRAPE SURGICAL HAND PROX AURORA

## (undated) DEVICE — SPLINT ORTH W5INXL2.5FT PLSTR UNPD PRECUT FAST CRM

## (undated) DEVICE — DRIP REDUCTION MANIFOLD

## (undated) DEVICE — Z CONVERTED USE 2275207 CLOTH PREP W7.5XL7.5IN 2% CHG SKIN ALC AND RNS FREE